# Patient Record
Sex: FEMALE | Race: BLACK OR AFRICAN AMERICAN | NOT HISPANIC OR LATINO | RURAL
[De-identification: names, ages, dates, MRNs, and addresses within clinical notes are randomized per-mention and may not be internally consistent; named-entity substitution may affect disease eponyms.]

---

## 2024-06-01 ENCOUNTER — HOSPITAL ENCOUNTER (INPATIENT)
Facility: HOSPITAL | Age: 89
LOS: 3 days | Discharge: NURSING, REHAB OR LONG-TERM CARE | DRG: 557 | End: 2024-06-04
Attending: EMERGENCY MEDICINE | Admitting: INTERNAL MEDICINE
Payer: MEDICARE

## 2024-06-01 DIAGNOSIS — N17.9 AKI (ACUTE KIDNEY INJURY): ICD-10-CM

## 2024-06-01 DIAGNOSIS — I35.1 AORTIC VALVE INSUFFICIENCY, ETIOLOGY OF CARDIAC VALVE DISEASE UNSPECIFIED: ICD-10-CM

## 2024-06-01 DIAGNOSIS — M62.82 NON-TRAUMATIC RHABDOMYOLYSIS: Primary | ICD-10-CM

## 2024-06-01 DIAGNOSIS — I21.A1 TYPE 2 MI (MYOCARDIAL INFARCTION): ICD-10-CM

## 2024-06-01 DIAGNOSIS — R53.81 DEBILITY: ICD-10-CM

## 2024-06-01 DIAGNOSIS — R79.89 ELEVATED TROPONIN LEVEL: ICD-10-CM
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
## 2024-06-01 DIAGNOSIS — E87.20 LACTIC ACIDOSIS: ICD-10-CM

## 2024-06-01 DIAGNOSIS — I50.21 ACUTE SYSTOLIC HEART FAILURE: ICD-10-CM

## 2024-06-01 DIAGNOSIS — R53.1 GENERAL WEAKNESS: ICD-10-CM

## 2024-06-01 DIAGNOSIS — R07.9 CHEST PAIN: ICD-10-CM

## 2024-06-01 PROBLEM — R65.20 SEVERE SEPSIS: Status: ACTIVE | Noted: 2024-06-01

## 2024-06-01 PROBLEM — A41.9 SEVERE SEPSIS: Status: ACTIVE | Noted: 2024-06-01

## 2024-06-01 LAB
ALBUMIN SERPL BCP-MCNC: 3.5 G/DL (ref 3.5–5)
ALBUMIN/GLOB SERPL: 0.9 {RATIO}
ALP SERPL-CCNC: 113 U/L (ref 55–142)
ALT SERPL W P-5'-P-CCNC: 81 U/L (ref 13–56)
ANION GAP SERPL CALCULATED.3IONS-SCNC: 10 MMOL/L (ref 7–16)
APTT PPP: 26.3 SECONDS (ref 25.2–37.3)
AST SERPL W P-5'-P-CCNC: 209 U/L (ref 15–37)
BACTERIA #/AREA URNS HPF: ABNORMAL /HPF
BASOPHILS # BLD AUTO: 0.01 K/UL (ref 0–0.2)
BASOPHILS NFR BLD AUTO: 0.1 % (ref 0–1)
BILIRUB SERPL-MCNC: 1.2 MG/DL (ref ?–1.2)
BILIRUB UR QL STRIP: NEGATIVE
BUN SERPL-MCNC: 28 MG/DL (ref 7–18)
BUN/CREAT SERPL: 19 (ref 6–20)
CALCIUM SERPL-MCNC: 9.9 MG/DL (ref 8.5–10.1)
CHLORIDE SERPL-SCNC: 113 MMOL/L (ref 98–107)
CK SERPL-CCNC: 5454 U/L (ref 26–192)
CLARITY UR: CLEAR
CO2 SERPL-SCNC: 25 MMOL/L (ref 21–32)
COLOR UR: YELLOW
CREAT SERPL-MCNC: 1.45 MG/DL (ref 0.55–1.02)
DIFFERENTIAL METHOD BLD: ABNORMAL
EGFR (NO RACE VARIABLE) (RUSH/TITUS): 34 ML/MIN/1.73M2
EOSINOPHIL # BLD AUTO: 0 K/UL (ref 0–0.5)
EOSINOPHIL NFR BLD AUTO: 0 % (ref 1–4)
ERYTHROCYTE [DISTWIDTH] IN BLOOD BY AUTOMATED COUNT: 14.2 % (ref 11.5–14.5)
GLOBULIN SER-MCNC: 3.7 G/DL (ref 2–4)
GLUCOSE SERPL-MCNC: 132 MG/DL (ref 74–106)
GLUCOSE UR STRIP-MCNC: NEGATIVE MG/DL
HCT VFR BLD AUTO: 37.4 % (ref 38–47)
HGB BLD-MCNC: 12.5 G/DL (ref 12–16)
HYALINE CASTS #/AREA URNS LPF: ABNORMAL /LPF
IMM GRANULOCYTES # BLD AUTO: 0.07 K/UL (ref 0–0.04)
IMM GRANULOCYTES NFR BLD: 0.5 % (ref 0–0.4)
INR BLD: 1.14
KETONES UR STRIP-SCNC: 15 MG/DL
LACTATE SERPL-SCNC: 2.7 MMOL/L (ref 0.4–2)
LACTATE SERPL-SCNC: 3.5 MMOL/L (ref 0.4–2)
LEUKOCYTE ESTERASE UR QL STRIP: NEGATIVE
LYMPHOCYTES # BLD AUTO: 1.66 K/UL (ref 1–4.8)
LYMPHOCYTES NFR BLD AUTO: 12.3 % (ref 27–41)
MAGNESIUM SERPL-MCNC: 2.4 MG/DL (ref 1.7–2.3)
MCH RBC QN AUTO: 27.5 PG (ref 27–31)
MCHC RBC AUTO-ENTMCNC: 33.4 G/DL (ref 32–36)
MCV RBC AUTO: 82.4 FL (ref 80–96)
MONOCYTES # BLD AUTO: 1.25 K/UL (ref 0–0.8)
MONOCYTES NFR BLD AUTO: 9.3 % (ref 2–6)
MPC BLD CALC-MCNC: 10.6 FL (ref 9.4–12.4)
MUCOUS, UA: ABNORMAL /LPF
NEUTROPHILS # BLD AUTO: 10.52 K/UL (ref 1.8–7.7)
NEUTROPHILS NFR BLD AUTO: 77.8 % (ref 53–65)
NITRITE UR QL STRIP: NEGATIVE
NRBC # BLD AUTO: 0 X10E3/UL
NRBC, AUTO (.00): 0 %
NT-PROBNP SERPL-MCNC: ABNORMAL PG/ML (ref 1–450)
PH UR STRIP: 5 PH UNITS
PHOSPHATE SERPL-MCNC: 3.1 MG/DL (ref 2.5–4.5)
PLATELET # BLD AUTO: 163 K/UL (ref 150–400)
POTASSIUM SERPL-SCNC: 4.4 MMOL/L (ref 3.5–5.1)
PROT SERPL-MCNC: 7.2 G/DL (ref 6.4–8.2)
PROT UR QL STRIP: 100
PROTHROMBIN TIME: 14.5 SECONDS (ref 11.7–14.7)
RBC # BLD AUTO: 4.54 M/UL (ref 4.2–5.4)
RBC # UR STRIP: ABNORMAL /UL
RBC #/AREA URNS HPF: 2 /HPF
SARS-COV-2 RDRP RESP QL NAA+PROBE: NEGATIVE
SODIUM SERPL-SCNC: 144 MMOL/L (ref 136–145)
SP GR UR STRIP: 1.02
SQUAMOUS #/AREA URNS LPF: ABNORMAL /HPF
TROPONIN I SERPL DL<=0.01 NG/ML-MCNC: 5452.7 PG/ML
TROPONIN I SERPL DL<=0.01 NG/ML-MCNC: 5582.6 PG/ML
TSH SERPL DL<=0.005 MIU/L-ACNC: 1.03 UIU/ML (ref 0.36–3.74)
UROBILINOGEN UR STRIP-ACNC: 0.2 MG/DL
WBC # BLD AUTO: 13.51 K/UL (ref 4.5–11)
WBC #/AREA URNS HPF: 1 /HPF

## 2024-06-01 PROCEDURE — 83605 ASSAY OF LACTIC ACID: CPT | Performed by: EMERGENCY MEDICINE

## 2024-06-01 PROCEDURE — 85025 COMPLETE CBC W/AUTO DIFF WBC: CPT | Performed by: EMERGENCY MEDICINE

## 2024-06-01 PROCEDURE — 83880 ASSAY OF NATRIURETIC PEPTIDE: CPT

## 2024-06-01 PROCEDURE — 11000001 HC ACUTE MED/SURG PRIVATE ROOM

## 2024-06-01 PROCEDURE — 84443 ASSAY THYROID STIM HORMONE: CPT

## 2024-06-01 PROCEDURE — 85730 THROMBOPLASTIN TIME PARTIAL: CPT | Performed by: EMERGENCY MEDICINE

## 2024-06-01 PROCEDURE — 81001 URINALYSIS AUTO W/SCOPE: CPT | Performed by: EMERGENCY MEDICINE

## 2024-06-01 PROCEDURE — 99223 1ST HOSP IP/OBS HIGH 75: CPT | Mod: AI,GC,, | Performed by: INTERNAL MEDICINE

## 2024-06-01 PROCEDURE — 87040 BLOOD CULTURE FOR BACTERIA: CPT | Performed by: EMERGENCY MEDICINE

## 2024-06-01 PROCEDURE — 36415 COLL VENOUS BLD VENIPUNCTURE: CPT | Performed by: EMERGENCY MEDICINE

## 2024-06-01 PROCEDURE — 84484 ASSAY OF TROPONIN QUANT: CPT | Performed by: EMERGENCY MEDICINE

## 2024-06-01 PROCEDURE — 25000003 PHARM REV CODE 250: Performed by: INTERNAL MEDICINE

## 2024-06-01 PROCEDURE — 83036 HEMOGLOBIN GLYCOSYLATED A1C: CPT

## 2024-06-01 PROCEDURE — 84100 ASSAY OF PHOSPHORUS: CPT

## 2024-06-01 PROCEDURE — 83735 ASSAY OF MAGNESIUM: CPT | Performed by: EMERGENCY MEDICINE

## 2024-06-01 PROCEDURE — 93010 ELECTROCARDIOGRAM REPORT: CPT | Mod: ,,, | Performed by: INTERNAL MEDICINE

## 2024-06-01 PROCEDURE — 63600175 PHARM REV CODE 636 W HCPCS

## 2024-06-01 PROCEDURE — 63600175 PHARM REV CODE 636 W HCPCS: Performed by: INTERNAL MEDICINE

## 2024-06-01 PROCEDURE — 93005 ELECTROCARDIOGRAM TRACING: CPT

## 2024-06-01 PROCEDURE — 82550 ASSAY OF CK (CPK): CPT

## 2024-06-01 PROCEDURE — 99285 EMERGENCY DEPT VISIT HI MDM: CPT | Mod: 25

## 2024-06-01 PROCEDURE — 85610 PROTHROMBIN TIME: CPT | Performed by: EMERGENCY MEDICINE

## 2024-06-01 PROCEDURE — 87635 SARS-COV-2 COVID-19 AMP PRB: CPT | Performed by: EMERGENCY MEDICINE

## 2024-06-01 PROCEDURE — 25000003 PHARM REV CODE 250: Performed by: EMERGENCY MEDICINE

## 2024-06-01 PROCEDURE — 96360 HYDRATION IV INFUSION INIT: CPT

## 2024-06-01 PROCEDURE — 80053 COMPREHEN METABOLIC PANEL: CPT | Performed by: EMERGENCY MEDICINE

## 2024-06-01 RX ORDER — SODIUM CHLORIDE 0.9 % (FLUSH) 0.9 %
10 SYRINGE (ML) INJECTION EVERY 12 HOURS PRN
Status: DISCONTINUED | OUTPATIENT
Start: 2024-06-01 | End: 2024-06-04 | Stop reason: HOSPADM

## 2024-06-01 RX ORDER — ALUMINUM HYDROXIDE, MAGNESIUM HYDROXIDE, AND SIMETHICONE 2400; 240; 2400 MG/30ML; MG/30ML; MG/30ML
15 SUSPENSION ORAL 4 TIMES DAILY PRN
Status: DISCONTINUED | OUTPATIENT
Start: 2024-06-01 | End: 2024-06-04 | Stop reason: HOSPADM

## 2024-06-01 RX ORDER — TALC
6 POWDER (GRAM) TOPICAL NIGHTLY PRN
Status: DISCONTINUED | OUTPATIENT
Start: 2024-06-01 | End: 2024-06-04 | Stop reason: HOSPADM

## 2024-06-01 RX ORDER — POLYETHYLENE GLYCOL 3350 17 G/17G
17 POWDER, FOR SOLUTION ORAL 2 TIMES DAILY PRN
Status: DISCONTINUED | OUTPATIENT
Start: 2024-06-01 | End: 2024-06-04 | Stop reason: HOSPADM

## 2024-06-01 RX ORDER — IBUPROFEN 200 MG
16 TABLET ORAL
Status: DISCONTINUED | OUTPATIENT
Start: 2024-06-01 | End: 2024-06-04 | Stop reason: HOSPADM

## 2024-06-01 RX ORDER — PROCHLORPERAZINE EDISYLATE 5 MG/ML
5 INJECTION INTRAMUSCULAR; INTRAVENOUS EVERY 6 HOURS PRN
Status: DISCONTINUED | OUTPATIENT
Start: 2024-06-01 | End: 2024-06-04 | Stop reason: HOSPADM

## 2024-06-01 RX ORDER — MULTIVITAMIN
1 TABLET ORAL DAILY
COMMUNITY

## 2024-06-01 RX ORDER — GLUCAGON 1 MG
1 KIT INJECTION
Status: DISCONTINUED | OUTPATIENT
Start: 2024-06-01 | End: 2024-06-04 | Stop reason: HOSPADM

## 2024-06-01 RX ORDER — ONDANSETRON 4 MG/1
8 TABLET, ORALLY DISINTEGRATING ORAL EVERY 8 HOURS PRN
Status: DISCONTINUED | OUTPATIENT
Start: 2024-06-01 | End: 2024-06-04 | Stop reason: HOSPADM

## 2024-06-01 RX ORDER — NALOXONE HCL 0.4 MG/ML
0.02 VIAL (ML) INJECTION
Status: DISCONTINUED | OUTPATIENT
Start: 2024-06-01 | End: 2024-06-04 | Stop reason: HOSPADM

## 2024-06-01 RX ORDER — IBUPROFEN 200 MG
24 TABLET ORAL
Status: DISCONTINUED | OUTPATIENT
Start: 2024-06-01 | End: 2024-06-04 | Stop reason: HOSPADM

## 2024-06-01 RX ORDER — ACETAMINOPHEN 500 MG
1000 TABLET ORAL EVERY 8 HOURS PRN
Status: DISCONTINUED | OUTPATIENT
Start: 2024-06-01 | End: 2024-06-04 | Stop reason: HOSPADM

## 2024-06-01 RX ORDER — ASPIRIN 325 MG
325 TABLET ORAL
Status: COMPLETED | OUTPATIENT
Start: 2024-06-01 | End: 2024-06-01

## 2024-06-01 RX ORDER — HEPARIN SODIUM,PORCINE/D5W 25000/250
0-40 INTRAVENOUS SOLUTION INTRAVENOUS CONTINUOUS
Status: DISCONTINUED | OUTPATIENT
Start: 2024-06-01 | End: 2024-06-02

## 2024-06-01 RX ORDER — AMOXICILLIN 250 MG
1 CAPSULE ORAL 2 TIMES DAILY PRN
Status: DISCONTINUED | OUTPATIENT
Start: 2024-06-01 | End: 2024-06-04 | Stop reason: HOSPADM

## 2024-06-01 RX ORDER — SODIUM CHLORIDE, SODIUM LACTATE, POTASSIUM CHLORIDE, CALCIUM CHLORIDE 600; 310; 30; 20 MG/100ML; MG/100ML; MG/100ML; MG/100ML
INJECTION, SOLUTION INTRAVENOUS CONTINUOUS
Status: DISCONTINUED | OUTPATIENT
Start: 2024-06-01 | End: 2024-06-02

## 2024-06-01 RX ORDER — ACETAMINOPHEN 325 MG/1
650 TABLET ORAL EVERY 4 HOURS PRN
Status: DISCONTINUED | OUTPATIENT
Start: 2024-06-01 | End: 2024-06-04 | Stop reason: HOSPADM

## 2024-06-01 RX ADMIN — SODIUM CHLORIDE 500 ML: 9 INJECTION, SOLUTION INTRAVENOUS at 11:06

## 2024-06-01 RX ADMIN — SODIUM CHLORIDE 1000 ML: 9 INJECTION, SOLUTION INTRAVENOUS at 08:06

## 2024-06-01 RX ADMIN — ASPIRIN 325 MG ORAL TABLET 325 MG: 325 PILL ORAL at 09:06

## 2024-06-01 RX ADMIN — PIPERACILLIN SODIUM,TAZOBACTAM SODIUM 4.5 G: 4; .5 INJECTION, POWDER, FOR SOLUTION INTRAVENOUS at 11:06

## 2024-06-01 RX ADMIN — HEPARIN SODIUM 18 UNITS/KG/HR: 10000 INJECTION, SOLUTION INTRAVENOUS at 11:06

## 2024-06-02 PROBLEM — T79.6XXA TRAUMATIC RHABDOMYOLYSIS: Status: ACTIVE | Noted: 2024-06-02

## 2024-06-02 PROBLEM — M62.82 NON-TRAUMATIC RHABDOMYOLYSIS: Status: ACTIVE | Noted: 2024-06-02

## 2024-06-02 PROBLEM — E87.20 LACTIC ACIDOSIS: Status: ACTIVE | Noted: 2024-06-01

## 2024-06-02 LAB
ALBUMIN SERPL BCP-MCNC: 3.8 G/DL (ref 3.5–5)
ALBUMIN/GLOB SERPL: 0.9 {RATIO}
ALP SERPL-CCNC: 121 U/L (ref 55–142)
ALT SERPL W P-5'-P-CCNC: 95 U/L (ref 13–56)
ANION GAP SERPL CALCULATED.3IONS-SCNC: 12 MMOL/L (ref 7–16)
AST SERPL W P-5'-P-CCNC: 253 U/L (ref 15–37)
BASOPHILS # BLD AUTO: 0.02 K/UL (ref 0–0.2)
BASOPHILS NFR BLD AUTO: 0.1 % (ref 0–1)
BILIRUB SERPL-MCNC: 1.5 MG/DL (ref ?–1.2)
BUN SERPL-MCNC: 27 MG/DL (ref 7–18)
BUN/CREAT SERPL: 20 (ref 6–20)
CALCIUM SERPL-MCNC: 9.9 MG/DL (ref 8.5–10.1)
CHLORIDE SERPL-SCNC: 113 MMOL/L (ref 98–107)
CHOLEST SERPL-MCNC: 158 MG/DL (ref 0–200)
CHOLEST/HDLC SERPL: 2.5 {RATIO}
CK SERPL-CCNC: 5301 U/L (ref 26–192)
CO2 SERPL-SCNC: 21 MMOL/L (ref 21–32)
CREAT SERPL-MCNC: 1.32 MG/DL (ref 0.55–1.02)
DIFFERENTIAL METHOD BLD: ABNORMAL
EGFR (NO RACE VARIABLE) (RUSH/TITUS): 38 ML/MIN/1.73M2
EOSINOPHIL # BLD AUTO: 0 K/UL (ref 0–0.5)
EOSINOPHIL NFR BLD AUTO: 0 % (ref 1–4)
ERYTHROCYTE [DISTWIDTH] IN BLOOD BY AUTOMATED COUNT: 14.7 % (ref 11.5–14.5)
EST. AVERAGE GLUCOSE BLD GHB EST-MCNC: 108 MG/DL
GLOBULIN SER-MCNC: 4.3 G/DL (ref 2–4)
GLUCOSE SERPL-MCNC: 123 MG/DL (ref 74–106)
HBA1C MFR BLD HPLC: 5.4 % (ref 4.5–6.6)
HCT VFR BLD AUTO: 43.9 % (ref 38–47)
HDLC SERPL-MCNC: 63 MG/DL (ref 40–60)
HGB BLD-MCNC: 14.6 G/DL (ref 12–16)
IMM GRANULOCYTES # BLD AUTO: 0.08 K/UL (ref 0–0.04)
IMM GRANULOCYTES NFR BLD: 0.5 % (ref 0–0.4)
LACTATE SERPL-SCNC: 2.6 MMOL/L (ref 0.4–2)
LACTATE SERPL-SCNC: 2.9 MMOL/L (ref 0.4–2)
LACTATE SERPL-SCNC: 4.9 MMOL/L (ref 0.4–2)
LDLC SERPL CALC-MCNC: 80 MG/DL
LDLC/HDLC SERPL: 1.3 {RATIO}
LYMPHOCYTES # BLD AUTO: 1.88 K/UL (ref 1–4.8)
LYMPHOCYTES NFR BLD AUTO: 12.5 % (ref 27–41)
MCH RBC QN AUTO: 27.8 PG (ref 27–31)
MCHC RBC AUTO-ENTMCNC: 33.3 G/DL (ref 32–36)
MCV RBC AUTO: 83.5 FL (ref 80–96)
MONOCYTES # BLD AUTO: 1.11 K/UL (ref 0–0.8)
MONOCYTES NFR BLD AUTO: 7.4 % (ref 2–6)
MPC BLD CALC-MCNC: 11.4 FL (ref 9.4–12.4)
NEUTROPHILS # BLD AUTO: 11.94 K/UL (ref 1.8–7.7)
NEUTROPHILS NFR BLD AUTO: 79.5 % (ref 53–65)
NONHDLC SERPL-MCNC: 95 MG/DL
NRBC # BLD AUTO: 0 X10E3/UL
NRBC, AUTO (.00): 0 %
PLATELET # BLD AUTO: 147 K/UL (ref 150–400)
POTASSIUM SERPL-SCNC: 4.2 MMOL/L (ref 3.5–5.1)
PROT SERPL-MCNC: 8.1 G/DL (ref 6.4–8.2)
RBC # BLD AUTO: 5.26 M/UL (ref 4.2–5.4)
SODIUM SERPL-SCNC: 142 MMOL/L (ref 136–145)
TRIGL SERPL-MCNC: 73 MG/DL (ref 35–150)
TROPONIN I SERPL DL<=0.01 NG/ML-MCNC: 3123.1 PG/ML
VLDLC SERPL-MCNC: 15 MG/DL
WBC # BLD AUTO: 15.03 K/UL (ref 4.5–11)

## 2024-06-02 PROCEDURE — 63600175 PHARM REV CODE 636 W HCPCS: Performed by: INTERNAL MEDICINE

## 2024-06-02 PROCEDURE — 94761 N-INVAS EAR/PLS OXIMETRY MLT: CPT

## 2024-06-02 PROCEDURE — 84484 ASSAY OF TROPONIN QUANT: CPT | Performed by: INTERNAL MEDICINE

## 2024-06-02 PROCEDURE — 97161 PT EVAL LOW COMPLEX 20 MIN: CPT

## 2024-06-02 PROCEDURE — 97165 OT EVAL LOW COMPLEX 30 MIN: CPT

## 2024-06-02 PROCEDURE — 83605 ASSAY OF LACTIC ACID: CPT | Performed by: EMERGENCY MEDICINE

## 2024-06-02 PROCEDURE — 36415 COLL VENOUS BLD VENIPUNCTURE: CPT | Performed by: EMERGENCY MEDICINE

## 2024-06-02 PROCEDURE — 85025 COMPLETE CBC W/AUTO DIFF WBC: CPT

## 2024-06-02 PROCEDURE — 25000003 PHARM REV CODE 250: Performed by: INTERNAL MEDICINE

## 2024-06-02 PROCEDURE — 25000003 PHARM REV CODE 250

## 2024-06-02 PROCEDURE — 36415 COLL VENOUS BLD VENIPUNCTURE: CPT | Performed by: INTERNAL MEDICINE

## 2024-06-02 PROCEDURE — 36415 COLL VENOUS BLD VENIPUNCTURE: CPT

## 2024-06-02 PROCEDURE — 80053 COMPREHEN METABOLIC PANEL: CPT

## 2024-06-02 PROCEDURE — 11000001 HC ACUTE MED/SURG PRIVATE ROOM

## 2024-06-02 PROCEDURE — 99233 SBSQ HOSP IP/OBS HIGH 50: CPT | Mod: ,,, | Performed by: INTERNAL MEDICINE

## 2024-06-02 PROCEDURE — 80061 LIPID PANEL: CPT

## 2024-06-02 PROCEDURE — A4217 STERILE WATER/SALINE, 500 ML: HCPCS | Performed by: INTERNAL MEDICINE

## 2024-06-02 PROCEDURE — 82550 ASSAY OF CK (CPK): CPT

## 2024-06-02 RX ORDER — METOPROLOL TARTRATE 25 MG/1
25 TABLET, FILM COATED ORAL 2 TIMES DAILY
Status: DISCONTINUED | OUTPATIENT
Start: 2024-06-02 | End: 2024-06-02

## 2024-06-02 RX ORDER — NAPROXEN SODIUM 220 MG/1
81 TABLET, FILM COATED ORAL DAILY
Status: DISCONTINUED | OUTPATIENT
Start: 2024-06-02 | End: 2024-06-04 | Stop reason: HOSPADM

## 2024-06-02 RX ORDER — ENOXAPARIN SODIUM 100 MG/ML
1 INJECTION SUBCUTANEOUS EVERY 24 HOURS
Status: DISCONTINUED | OUTPATIENT
Start: 2024-06-02 | End: 2024-06-04 | Stop reason: HOSPADM

## 2024-06-02 RX ORDER — SODIUM CHLORIDE, SODIUM LACTATE, POTASSIUM CHLORIDE, CALCIUM CHLORIDE 600; 310; 30; 20 MG/100ML; MG/100ML; MG/100ML; MG/100ML
INJECTION, SOLUTION INTRAVENOUS CONTINUOUS
Status: DISCONTINUED | OUTPATIENT
Start: 2024-06-02 | End: 2024-06-04

## 2024-06-02 RX ORDER — ENOXAPARIN SODIUM 100 MG/ML
1 INJECTION SUBCUTANEOUS EVERY 12 HOURS
Status: DISCONTINUED | OUTPATIENT
Start: 2024-06-02 | End: 2024-06-02

## 2024-06-02 RX ADMIN — SODIUM CHLORIDE: 4 INJECTION, SOLUTION, CONCENTRATE INTRAVENOUS at 01:06

## 2024-06-02 RX ADMIN — SODIUM CHLORIDE, POTASSIUM CHLORIDE, SODIUM LACTATE AND CALCIUM CHLORIDE: 600; 310; 30; 20 INJECTION, SOLUTION INTRAVENOUS at 11:06

## 2024-06-02 RX ADMIN — ENOXAPARIN SODIUM 60 MG: 60 INJECTION SUBCUTANEOUS at 04:06

## 2024-06-02 RX ADMIN — SODIUM CHLORIDE, POTASSIUM CHLORIDE, SODIUM LACTATE AND CALCIUM CHLORIDE: 600; 310; 30; 20 INJECTION, SOLUTION INTRAVENOUS at 10:06

## 2024-06-02 RX ADMIN — ASPIRIN 81 MG CHEWABLE TABLET 81 MG: 81 TABLET CHEWABLE at 11:06

## 2024-06-02 RX ADMIN — THERA TABS 1 TABLET: TAB at 11:06

## 2024-06-02 NOTE — SUBJECTIVE & OBJECTIVE
Interval History: Patient seen and examined at the bedside, denies chest pain or shortness of breath. Reports feeling better in terms of weakness.     Review of Systems   All other systems reviewed and are negative.    Objective:     Vital Signs (Most Recent):  Temp: 97.4 °F (36.3 °C) (06/02/24 1205)  Pulse: 72 (06/02/24 1205)  Resp: 18 (06/02/24 1205)  BP: 103/71 (06/02/24 1205)  SpO2: 96 % (06/02/24 1205) Vital Signs (24h Range):  Temp:  [97.4 °F (36.3 °C)-98.1 °F (36.7 °C)] 97.4 °F (36.3 °C)  Pulse:  [67-84] 72  Resp:  [12-27] 18  SpO2:  [93 %-100 %] 96 %  BP: ()/(57-71) 103/71     Weight: 62.1 kg (137 lb)  Body mass index is 22.8 kg/m².    Intake/Output Summary (Last 24 hours) at 6/2/2024 1233  Last data filed at 6/2/2024 0511  Gross per 24 hour   Intake 58.14 ml   Output --   Net 58.14 ml         Physical Exam  Vitals and nursing note reviewed.   Constitutional:       Comments: Frail appearing   HENT:      Head: Normocephalic and atraumatic.      Mouth/Throat:      Mouth: Mucous membranes are moist.   Eyes:      Extraocular Movements: Extraocular movements intact.      Pupils: Pupils are equal, round, and reactive to light.   Cardiovascular:      Rate and Rhythm: Normal rate and regular rhythm.   Pulmonary:      Effort: Pulmonary effort is normal.      Breath sounds: Normal breath sounds.   Abdominal:      General: Bowel sounds are normal.      Palpations: Abdomen is soft.   Musculoskeletal:         General: Normal range of motion.      Cervical back: Normal range of motion and neck supple.   Neurological:      General: No focal deficit present.      Mental Status: She is alert and oriented to person, place, and time. Mental status is at baseline.   Psychiatric:         Mood and Affect: Mood normal.         Behavior: Behavior normal.             Significant Labs: All pertinent labs within the past 24 hours have been reviewed.    Significant Imaging: I have reviewed all pertinent imaging results/findings  within the past 24 hours.

## 2024-06-02 NOTE — ED PROVIDER NOTES
Encounter Date: 2024       History     Chief Complaint   Patient presents with    Fatigue     Patient arrives by ambulance from her home where she has been generally weak and falling a lot today.  Patient despite her age of 89 has no known medical history per the neighbor who called the ambulance.  Patient it was .  She had 1 child a daughter who has .  Patient does have grandchildren in [place].  She lives alone.  A neighbor checks on her says that she was in the floor multiple times today.      Patient denies any right pain but does have some mild tenderness of the right hip.  No focal weakness or numbness.      Review of patient's allergies indicates:  No Known Allergies  Past Medical History:   Diagnosis Date    Chronic low back pain     Chronic pain of right knee     Mixed hyperlipidemia     Syncope     Vitamin D deficiency      History reviewed. No pertinent surgical history.  No family history on file.  Social History     Tobacco Use    Smoking status: Never    Smokeless tobacco: Never   Substance Use Topics    Drug use: Never     Review of Systems   Constitutional:  Negative for fever.   HENT:  Negative for sore throat.    Respiratory:  Negative for shortness of breath.    Cardiovascular:  Negative for chest pain.   Gastrointestinal:  Negative for nausea.   Genitourinary:  Negative for dysuria.   Musculoskeletal:  Negative for back pain.   Skin:  Negative for rash.   Neurological:  Positive for light-headedness. Negative for speech difficulty and weakness (No focal weakness.).   Hematological:  Does not bruise/bleed easily.       Physical Exam     Initial Vitals   BP Pulse Resp Temp SpO2   24   91/61 83 19 98.1 °F (36.7 °C) 96 %      MAP       --                Physical Exam    Nursing note and vitals reviewed.  Constitutional: She appears well-developed and well-nourished.   HENT:   Head: Normocephalic and atraumatic.   Eyes:  EOM are normal. Pupils are equal, round, and reactive to light.   Neck: Neck supple. No thyromegaly present.   Normal range of motion.  Cardiovascular:  Normal rate, regular rhythm, normal heart sounds and intact distal pulses.           No murmur heard.  Pulmonary/Chest: Breath sounds normal. No respiratory distress. She has no wheezes.   Abdominal: Abdomen is soft. Bowel sounds are normal. She exhibits no distension. There is no abdominal tenderness.   Musculoskeletal:         General: No tenderness or edema. Normal range of motion.      Cervical back: Normal range of motion and neck supple.     Lymphadenopathy:     She has no cervical adenopathy.   Neurological: She is alert. She has normal strength. No cranial nerve deficit or sensory deficit.   Oriented to person and place   Skin: Skin is warm and dry. No rash noted.   Psychiatric: She has a normal mood and affect.         Medical Screening Exam   See Full Note    ED Course   Procedures  Labs Reviewed   COMPREHENSIVE METABOLIC PANEL - Abnormal; Notable for the following components:       Result Value    Chloride 113 (*)     Glucose 132 (*)     BUN 28 (*)     Creatinine 1.45 (*)     ALT 81 (*)      (*)     eGFR 34 (*)     All other components within normal limits   TROPONIN I - Abnormal; Notable for the following components:    Troponin I High Sensitivity 5,582.6 (*)     All other components within normal limits   MAGNESIUM - Abnormal; Notable for the following components:    Magnesium 2.4 (*)     All other components within normal limits   LACTIC ACID, PLASMA - Abnormal; Notable for the following components:    Lactic Acid 3.5 (*)     All other components within normal limits   CBC WITH DIFFERENTIAL - Abnormal; Notable for the following components:    WBC 13.51 (*)     Hematocrit 37.4 (*)     Neutrophils % 77.8 (*)     Lymphocytes % 12.3 (*)     Monocytes % 9.3 (*)     Eosinophils % 0.0 (*)     Immature Granulocytes % 0.5 (*)     Neutrophils, Abs 10.52  (*)     Monocytes, Absolute 1.25 (*)     Immature Granulocytes, Absolute 0.07 (*)     All other components within normal limits   PROTIME-INR - Normal   APTT - Normal   CULTURE, BLOOD   CULTURE, BLOOD   CBC W/ AUTO DIFFERENTIAL    Narrative:     The following orders were created for panel order CBC auto differential.  Procedure                               Abnormality         Status                     ---------                               -----------         ------                     CBC with Differential[[id number]]       Abnormal            Final result                 Please view results for these tests on the individual orders.   URINALYSIS, REFLEX TO URINE CULTURE   SARS-COV-2 RNA AMPLIFICATION, QUAL          Imaging Results              X-Ray Hip 2 or 3 views Right with Pelvis when performed (Final result)  Result time 06/01/24 21:20:58      Final result by Khoi Haynes MD (06/01/24 21:20:58)                   Impression:      No acute radiographic abnormality.  Moderate degenerative changes.    Place of service: [facility]      Electronically signed by: Khoi Haynes  Date:    06/01/2024  Time:    21:20               Narrative:    EXAMINATION:  XR hip with pelvis, three views right    CLINICAL HISTORY:  Hip pain fall    TECHNIQUE:  AP frogleg    COMPARISON:  None    FINDINGS:  Osseous pelvis is intact.  Osteopenia is noted.  The right hip joint appears intact with no evidence of acute fracture or dislocation.  There is moderate to severe joint space loss and osteophytic spurring suggested at both hip joints.  No suspicious osseous or soft tissue lesions are identified.                                       X-Ray Chest 1 View (Final result)  Result time 06/01/24 21:19:25      Final result by Khoi Haynes MD (06/01/24 21:19:25)                   Impression:      No acute cardiopulmonary process.    Place of service: [facility]      Electronically signed  by: Khoi Haynes  Date:    06/01/2024  Time:    21:19               Narrative:    EXAMINATION:  XR CHEST 1 VIEW    CLINICAL HISTORY:  Weakness    TECHNIQUE:  Portable one view    COMPARISON:  None    FINDINGS:  The cardiomediastinal silhouette is within normal limits. The lungs are clear.  Punctate probable granulomatous calcifications are noted throughout the lungs bilaterally.  There is no pneumothorax or pleural effusion.    There is no acute osseous or soft tissue abnormality.  Somewhat prominent degenerative changes are noted within both shoulders.                                       CT Head Without Contrast (Final result)  Result time 06/01/24 21:11:29      Final result by Khoi Haynes MD (06/01/24 21:11:29)                   Impression:      1. No acute intracranial abnormality.    2. Findings suggestive of sequela of chronic microvascular ischemia.  If clinical symptoms persist or worsen, consider further evaluation with MRI imaging of the brain.    Place of service: [facility]      Electronically signed by: Khoi Haynes  Date:    06/01/2024  Time:    21:11               Narrative:    EXAMINATION:  CT HEAD WITHOUT CONTRAST    CLINICAL HISTORY:  Mental status change, unknown cause;    TECHNIQUE:  Axial CT imaging from the vertex to skull the skull base was performed without contrast. Total DLP: 973 mGy*cm    Dose reduction:    The CT exam was performed using one or more dose reduction techniques: Automatic exposure control, automated adjustment of the MA and/or kVP according to patient size, or use of iterative reconstruction technique.    COMPARISON:  None.    FINDINGS:  Mild generalized atrophy is noted with prominence of sulci and ventricles.  The basilar cisterns are within normal limits in appearance. There is no evidence of hydrocephalus, midline shift or mass effect.  Periventricular hypodensity changes are noted which do not demonstrate mass effect on are favored to represent sequela  of chronic microvascular ischemia.  Otherwise, gray and white matter differentiation is preserved. There is no CT evidence of acute intracranial hemorrhage or infarction.    The calvarium is intact. The visualized orbits and globes appear within normal limits. The paranasal sinuses and mastoid air cells are predominantly clear. Scalp soft tissues appear unremarkable.                                       Medications   sodium chloride 0.9% bolus 1,000 mL 1,000 mL (1,000 mLs Intravenous New Bag 6/1/24 2059)   sodium chloride 0.9% bolus 500 mL 500 mL (has no administration in time range)   aspirin tablet 325 mg (325 mg Oral Given 6/1/24 2140)     Medical Decision Making  MDM    Patient presents for emergent evaluation of acute generalized weakness frequent falls that poses a threat to life and/or bodily function.    In the ED patient found to have acute NSTEMI.  Lactic acidosis.  Urinalysis pending at the time of disposition  I ordered labs and personally reviewed them.  Labs significant for elevated troponin.  Creatinine 1.5 no baseline comparison.    I ordered X-rays and personally reviewed them and reviewed the radiologist interpretation.  Xray significant for chest x-ray no acute abnormality.  X-ray hip no fracture.  I ordered EKG and personally reviewed it.  EKG significant for T-wave inversions.  No ST elevation.    I ordered CT scan and personally reviewed it and reviewed the radiologist interpretation.  CT significant for CT head no acute abnormality.  No intracranial hemorrhage..      Admission MDM  I discussed the patient presentation and findings with the consultant for Hospital Medicine (speciality).    Patient was managed in the ED with IV normal saline Zosyn aspirin.    The response to treatment was stable.    Patient required emergent consultation to Hospital Medicine (admitting physician) for admission.     Amount and/or Complexity of Data Reviewed  Labs: ordered.  Radiology: ordered.    Risk  OTC  drugs.               ED Course as of 06/01/24 2147   Sat Jun 01, 2024 2121 Patient denies chest pain [PK]   2128 No source of infection identified.  Patient does smell of urine had some urinary incontinence.  White counts elevated and also lactic acid is elevated.  Patient does have significantly elevated troponin with some T-wave inversions in the anterior septal leads.  No ST elevation do not think this is consistent with a STEMI.  Will treat with aspirin for now and will also have her hospitalized.  Will defer to Hospital Medicine in regard of heparinization.  Will treat with empiric early goal-directed fluid therapy and also  antibiotic [PK]   2129 Patient had 500 mL of normal saline that was started by EMS and discontinued here at 9:00 p.m. [PK]   2133 hospitalize patient for failure to thrive and elevated troponin.  Urinalysis is not back yet.  Patient comes in by EMS from her home where she lives alone.  She does have a friend that checks on her regularly says that she would fallen multiple times today.  Do not suspect that she was injured due to the falls.  She denies chest pain.  Troponin is significantly elevated but denies chest pain and also the EKG does not show ST elevation.  She does have some T-wave inversions diffusely but no priors for comparison.  Patient has no known medical history despite her age.  She has a great great great granddaughter who is next of kin who we called and will be driving from [facility] ultimately to come get her.  Treating with aspirin.  Will defer to you home rather to heparinized.  Also treating with Zosyn due to leukocytosis and elevated lactic acid.  Blood pressure is 96/63 heart rate is 78.  She is awake alert and oriented looks okay for the floor in my opinion.  Will be giving her early goal-directed fluid therapy also and getting a 2nd troponin and a 2nd lactic acid   [PK]      ED Course User Index  [PK] Khoi Salinas MD                            Clinical Impression:   Final diagnoses:  [R53.1] General weakness  [W19.XXXA] Khoi Spear MD  06/01/24 6359

## 2024-06-02 NOTE — PT/OT/SLP EVAL
Occupational Therapy   Evaluation    Name: Steve Castro  MRN: [id number]  Admitting Diagnosis: Non-traumatic rhabdomyolysis  Recent Surgery: * No surgery found *      Recommendations:     Discharge Recommendations: Moderate Intensity Therapy (to low intensity, bepending on rate of recovery)  Discharge Equipment Recommendations:   (to be determined)  Barriers to discharge:  Decreased caregiver support (on going treatment)    Assessment:     Steve Castro is a 89 y.o. female with a medical diagnosis of Non-traumatic rhabdomyolysis.  She presents with alert and agreeable to treatment. Performance deficits affecting function: weakness, impaired endurance, impaired self care skills, impaired functional mobility, gait instability, impaired balance, decreased upper extremity function, decreased ROM, impaired cardiopulmonary response to activity.      Rehab Prognosis: Good; patient would benefit from acute skilled OT services to address these deficits and reach maximum level of function.       Plan:     Patient to be seen 5 x/week to address the above listed problems via self-care/home management, therapeutic activities, therapeutic exercises  Plan of Care Expires: 06/23/24  Plan of Care Reviewed with: patient    Subjective     Chief Complaint: Non-traumatic rhabdomyolysis    Patient/Family Comments/goals: Pt wants to return home at D/C. Pt is considering swingbed as a possible option if she requires additional help at D/C.    Occupational Profile:  Living Environment: Pt lives alone in a single level home  Previous level of function: Pt was independent with her self-care and homemaking and meal prep. Pt used a rollator for mobility.  Roles and Routines: Pt took care of herself and her home. Pt's friends and neighbors checked in on her regularly.  Equipment Used at Home: rollator  Assistance upon Discharge: Pt will have assistance from facility staff of family and friends    Pain/Comfort:  Pain Rating 1: 0/10  Pain Rating  Post-Intervention 1: 0/10    Patients cultural, spiritual, [employer] conflicts given the current situation: no    Objective:     Communicated with: EYRN Patricia prior to session.  Patient found supine with telemetry, peripheral IV upon OT entry to room.    General Precautions: Standard, fall  Orthopedic Precautions: N/A  Braces: N/A  Respiratory Status: Room air    Occupational Performance:    Bed Mobility:    Patient completed Supine to Sit with minimum assistance    Functional Mobility/Transfers:  Patient completed Sit <> Stand Transfer with minimum assistance  with  rolling walker   Patient completed Bed <> Chair Transfer using Step Transfer technique with minimum assistance with rolling walker  Patient completed Toilet Transfer Step Transfer technique with minimum assistance with  rolling walker  Functional Mobility: Pt ambulated short distances in room with RW and min(A) with a very flexed posture, short, narrow steps, and increased effort.    Activities of Daily Living:  Upper Body Dressing: maximal assistance due to (B) IV  Toileting: total assistance for hygiene and clothes management    Cognitive/Visual Perceptual:  Cognitive/Psychosocial Skills:     -       Oriented to: Person, Place, and Situation   -       Follows Commands/attention:Follows one-step commands  -       Communication: clear/fluent  -       Safety awareness/insight to disability: intact   -       Mood/Affect/Coping skills/emotional control: Cooperative    Physical Exam:  Balance:    -       sitting with supervision, standing with min(A) with RW  Dominant hand:    -       right  Upper Extremity Range of Motion:     -       Right Upper Extremity: WFL except shoulder is severely impaired  -       Left Upper Extremity: WFL except shoulder is mildly impaired  Upper Extremity Strength:    -       Right Upper Extremity: WFL except shoulder is severely impaired  -       Left Upper Extremity: WFL except shoulder is mildly impaired    Strength:    -       Right Upper Extremity: WFL  -       Left Upper Extremity: WFL  Fine Motor Coordination:    -       Intact  Gross motor coordination:   WFL    AMPAC 6 Click ADL:  AMPAC Total Score: 16    Treatment & Education:  Pt educated on OT role/POC.   Importance of OOB activity with staff assistance.  Importance of sitting up in the chair throughout the day as tolerated, especially for meals   Safety during functional t/f and mobility with use of RW  Importance of assisting with self-care activities   All questions/concerns answered within OT scope of practice      Patient left up in chair with all lines intact, call button in reach, and RN notified    GOALS:   Multidisciplinary Problems       Occupational Therapy Goals          Problem: Occupational Therapy    Goal Priority Disciplines Outcome Interventions   Occupational Therapy Goal     OT, PT/OT Progressing    Description: STG: (in 1 week)  Pt will perform grooming with setup  Pt will bathe with setup and CGA  Pt will perform UE dressing with setup  Pt will perform LE dressing with CGA  Pt will sit EOB x 10 min with independence during a dynamic activity   Pt will transfer bed/chair/bsc with SBA with RW  Pt will perform standing task x 3 min with CGA with RW   Pt will tolerate 15 minutes of tx without fatigue      LTG: (in 5 weeks)  1.Restore to max I with self care and mobility.                        History:     Past Medical History:   Diagnosis Date    Chronic low back pain     Chronic pain of right knee     Mixed hyperlipidemia     Syncope     Vitamin D deficiency        History reviewed. No pertinent surgical history.    Time Tracking:     OT Date of Treatment: 06/02/24  OT Start Time: 0905  OT Stop Time: 0940  OT Total Time (min): 35 min    Billable Minutes:Evaluation low complexity    6/2/2024

## 2024-06-02 NOTE — PLAN OF CARE
Problem: Occupational Therapy  Goal: Occupational Therapy Goal  Description: STG: (in 1 week)  Pt will perform grooming with setup  Pt will bathe with setup and CGA  Pt will perform UE dressing with setup  Pt will perform LE dressing with CGA  Pt will sit EOB x 10 min with independence during a dynamic activity   Pt will transfer bed/chair/bsc with SBA with RW  Pt will perform standing task x 3 min with CGA with RW   Pt will tolerate 15 minutes of tx without fatigue      LTG: (in 5 weeks)  1.Restore to max I with self care and mobility.   Outcome: Progressing     Pt demonstrates quick fatigue and increased need for assistance with mobility and self-care than her stated baseline. Depending on pt's rate of recovery, pt may benefit from swingbed to increase strength and mobility and self care skills prior to returning home.

## 2024-06-02 NOTE — HOSPITAL COURSE
6/2- Echocardiogram pending. Cardiology consulted, do not recommend any intervention at this point, changed heparin to weight based Lovenox, continue aspirin.   6/3- Difficult stick so labs drawn late, echo still pending. PT/OT follow, may require swing bed placement.     6/4 patient doing well today.  She declines left heart catheterization.  Her blood pressure will not tolerate guideline directed medical therapy for her heart failure.  We will have her follow up with Cardiology in 1-2 weeks.  She is agreeable to swing bed.  Plan for discharge to swing bed.  Follow up with PCP in 1 week.

## 2024-06-02 NOTE — ED TRIAGE NOTES
Pt brought in by EMS. Pt lives at home alone and a neighbor checks on her periodically throughout the day, neighbor called ems for frequent falls.

## 2024-06-02 NOTE — PLAN OF CARE
Problem: Adult Inpatient Plan of Care  Goal: Plan of Care Review  Outcome: Progressing  Goal: Patient-Specific Goal (Individualized)  Outcome: Progressing  Goal: Absence of Hospital-Acquired Illness or Injury  Outcome: Progressing  Goal: Optimal Comfort and Wellbeing  Outcome: Progressing  Goal: Readiness for Transition of Care  Outcome: Progressing     Problem: Sepsis/Septic Shock  Goal: Optimal Coping  Outcome: Progressing  Goal: Absence of Bleeding  Outcome: Progressing  Goal: Blood Glucose Level Within Targeted Range  Outcome: Progressing  Goal: Absence of Infection Signs and Symptoms  Outcome: Progressing  Goal: Optimal Nutrition Intake  Outcome: Progressing     Problem: Acute Kidney Injury/Impairment  Goal: Fluid and Electrolyte Balance  Outcome: Progressing  Goal: Improved Oral Intake  Outcome: Progressing  Goal: Effective Renal Function  Outcome: Progressing

## 2024-06-02 NOTE — PHARMACY MED REC
"Admission Medication History     The home medication history was taken by Amanda Walls.    You may go to "Admission" then "Reconcile Home Medications" tabs to review and/or act upon these items.     The home medication list has been updated by the Pharmacy department.   Please read ALL comments highlighted in yellow.   Please address this information as you see fit.    Feel free to contact us if you have any questions or require assistance.  According to patient, she doesn't take any prescription medications. She does occasionally take Tylenol as needed, and she takes a multivitamin, as well as Calcium, Vitamin D, and Ocuvite vitamins.      Medications listed below were obtained from: Patient/family and Analytic software- [employer]  (Not in a hospital admission)        Current Outpatient Medications on File Prior to Encounter   Medication Sig Dispense Refill Last Dose    multivitamin (THERAGRAN) per tablet Take 1 tablet by mouth once daily.   6/1/2024         Potential issues to be addressed PRIOR TO DISCHARGE  No issues identified.    Amanda Walls  Pharmacy Tech Specialist - Medication History  EXT. [id number]      .          "

## 2024-06-02 NOTE — ASSESSMENT & PLAN NOTE
Patient presented with elevated serum lactic acid. It was 3.5 at presentation and reduced to 2.7 after IV hydration.    Latest lactate reviewed-  Recent Labs   Lab 06/01/24  2238   LACTATE 2.7*       - blood cultures from two sites pending  - hydration via IV LR  - no source of infection was detected

## 2024-06-02 NOTE — ASSESSMENT & PLAN NOTE
This patient  indeterminate  have evidence of infective focus  My overall impression is sepsis.  Source: Unknown  Antibiotics given-   Antibiotics (72h ago, onward)      Start     Stop Route Frequency Ordered    06/02/24 0200  piperacillin-tazobactam (ZOSYN) 4.5 g in dextrose 5 % in water (D5W) 100 mL IVPB (MB+)         -- IV Every 8 hours (non-standard times) 06/01/24 2224    06/01/24 2230  piperacillin-tazobactam (ZOSYN) 4.5 g in dextrose 5 % in water (D5W) 100 mL IVPB (MB+)         06/02/24 1029 IV ED 1 Time 06/01/24 2228          Latest lactate reviewed-  Recent Labs   Lab 06/01/24 2034   LACTATE 3.5*     Organ dysfunction indicated by Acute kidney injury and elevated troponin and elevated lactate        Post- resuscitation assessment No - Post resuscitation assessment not needed       Will Not start Pressors- Levophed for MAP of 65  Source control achieved by: IV antibiotics  - blood cultures from two sites pending  - lactate was 3.5  - hydration via IV LR  - IV zosyn

## 2024-06-02 NOTE — ASSESSMENT & PLAN NOTE
Patient found to have Type 2 MI after presenting with:     KACY Risk Score: 75 (intermediate risk)  Wells score: 0  ECG:   NSR with no acute ST segment changes  Trending Troponin  ECHO pending  Patient on telemetry  Heparin infusion with NORMOGRAM for ACS protocol  Consulted cardiology and appreciate recommendations.     Troponin:   Recent Labs   Lab 06/01/24 2033   TROPONINIHS 5,582.6*

## 2024-06-02 NOTE — ASSESSMENT & PLAN NOTE
Patient found to have Type 2 MI after presenting with:     KACY Risk Score: 75 (intermediate risk)  Wells score: 0  ECG: NSR with no acute ST segment changes  Troponin significantly elevated however trend is flat.    Troponin:   Recent Labs   Lab 06/01/24  2238   TROPONINIHS 5,452.7*   ECHO pending  Weight based with Lovenox for ACS protocol.  Consulted cardiology and appreciate recommendations.    Holding off statin due to rabdomyolysis  Monitor on tele.

## 2024-06-02 NOTE — PLAN OF CARE
Problem: Physical Therapy  Goal: Physical Therapy Goal  Description: Short Term Goals to be met by: 2024    Patient will increase functional independence with mobility by performin. Supine to sit with independently  2. Sit to stand transfer with independently using Rolling walker  3. Bed to chair transfer with independently using Rolling walker  4. Gait  x 100 feet with independently using Rolling walker  5. Lower extremity exercise program x30 reps per handout, with assistance as needed    Long Term Goals to be met by: 2024    Pt will regain full independent functional mobility with rollator walker to return to home situation and prior activities of daily living.   Outcome: Progressing     Patient demonstrates a significant decline in functional mobility from reported baseline and is not safe to return to independent living situation at this time. Unless patient improves drastically over the next few days, swing bed is recommended.

## 2024-06-02 NOTE — ASSESSMENT & PLAN NOTE
Patient with acute kidney injury likely due to pre-renal azotemia due to dehydration and rhabdomyolysis. PARK is currently improving with IV hydration which will be continued.  Baseline creatinine unknown - Labs reviewed- Renal function/electrolytes with Estimated Creatinine Clearance: 22.3 mL/min (A) (based on SCr of 1.45 mg/dL (H)). according to latest data.   Monitor urine output and serial BMP and adjust therapy as needed.   Avoid nephrotoxins and renally dose meds for GFR listed above.

## 2024-06-02 NOTE — ASSESSMENT & PLAN NOTE
Patient with Acute on chronic debility due to age-related physical debility. Latest AMPAC and GEMS scores have not been reviewed. Evaluation for etiology is complete. Plan includes progressive mobility protocol initated, PT/OT consulted, and fall precautions in place.

## 2024-06-02 NOTE — CONSULTS
[facility]  Cardiology  Consult Note    Patient Name: Steve Castro  MRN: [id number]  Admission Date: 6/1/2024  Hospital Length of Stay: 1 days  Code Status: Full Code   Attending Provider: Chris Sheridan MD   Consulting Provider: Royer Yu MD  Primary Care Physician: Yudi, Primary Doctor  Principal Problem:Type 2 MI (myocardial infarction)    Patient information was obtained from patient, ER records, and primary team.     Consults  Subjective:     Chief Complaint: Fall    HPI:   Patient is a 89 yo female with no significant PMH. She presented to the [facility] ED with complaints of frequent falls. She reported that she has fallen at least twice since yesterday. She said that the first time she fell yesterday was when she was making breakfast and fell down. She said that she felt like she lost her balance and fell. She denied any dizziness, black out, diaphoresis, chest pain, SOB, palpitations, or lightheadedness before she fell. She denied hitting her head during the fall or loss of consciousness.    She reported that she has  not been eating or drinking well for last few days/weeks. Her urine output has been decreased that she contributed to decreased food intake.She has decreased appetite. She denied any chills, fever, myalgias, gastrointestinal or genitourinary symptoms.     She lives alone at her home and uses walker to move around. She is independent in ADLs. Her neighbors and [employer] members keep check on her.    Her neighbor was at the bedside and told that she has fallen at least 6-7 times since 2024. They found her lying down on floor yesterday after fall and had to help get up from the floor.    ED course:  Vitals:  P        BP         Temp      pO2  81        91/61     98.1°F      96  %  Labs:  CBC  13.51 12.5 163    37.4          BMP  144 113 28 132   4.4 25 1.45       Troponin was 5582.6  Lactate : 3.5  EKG: NSR no ST segment changes    Past Medical History:   Diagnosis Date     Chronic low back pain     Chronic pain of right knee     Mixed hyperlipidemia     Syncope     Vitamin D deficiency        History reviewed. No pertinent surgical history.    Review of patient's allergies indicates:  No Known Allergies    No current facility-administered medications on file prior to encounter.     Current Outpatient Medications on File Prior to Encounter   Medication Sig    multivitamin (THERAGRAN) per tablet Take 1 tablet by mouth once daily.     Family History    None       Tobacco Use    Smoking status: Never    Smokeless tobacco: Never   Substance and Sexual Activity    Alcohol use: Not on file    Drug use: Never    Sexual activity: Not on file     Review of Systems   Constitutional: Negative for diaphoresis and fever.   HENT:  Negative for hoarse voice.    Eyes:  Negative for visual disturbance and visual halos.   Cardiovascular:  Negative for chest pain and leg swelling.   Respiratory:  Negative for shortness of breath.    Gastrointestinal:  Negative for abdominal pain.   Psychiatric/Behavioral:  Negative for altered mental status, depression and hallucinations.      Objective:     Vital Signs (Most Recent):  Temp: 97.4 °F (36.3 °C) (06/02/24 1205)  Pulse: 72 (06/02/24 1205)  Resp: 18 (06/02/24 1205)  BP: 103/71 (06/02/24 1205)  SpO2: 96 % (06/02/24 1205) Vital Signs (24h Range):  Temp:  [97.4 °F (36.3 °C)-98.1 °F (36.7 °C)] 97.4 °F (36.3 °C)  Pulse:  [67-84] 72  Resp:  [12-27] 18  SpO2:  [93 %-100 %] 96 %  BP: ()/(57-71) 103/71     Weight: 62.1 kg (137 lb)  Body mass index is 22.8 kg/m².    SpO2: 96 %         Intake/Output Summary (Last 24 hours) at 6/2/2024 1543  Last data filed at 6/2/2024 0511  Gross per 24 hour   Intake 58.14 ml   Output --   Net 58.14 ml       Lines/Drains/Airways       Peripheral Intravenous Line  Duration                  Peripheral IV - Single Lumen 06/01/24 20 G Anterior;Right Forearm 1 day         Peripheral IV - Single Lumen 06/01/24 2300 22 G  Anterior;Left;Proximal Forearm <1 day                     Physical Exam  Vitals and nursing note reviewed.   Constitutional:       General: She is not in acute distress.     Appearance: Normal appearance. She is not ill-appearing.   HENT:      Head: Normocephalic and atraumatic.      Right Ear: External ear normal.      Left Ear: External ear normal.   Eyes:      General:         Right eye: No discharge.         Left eye: No discharge.      Conjunctiva/sclera: Conjunctivae normal.   Cardiovascular:      Rate and Rhythm: Normal rate and regular rhythm.      Pulses: Normal pulses.      Heart sounds: Murmur heard.   Pulmonary:      Effort: Pulmonary effort is normal.      Breath sounds: Normal breath sounds. No wheezing, rhonchi or rales.   Abdominal:      Palpations: Abdomen is soft.   Musculoskeletal:      Cervical back: Neck supple.      Right lower leg: No edema.      Left lower leg: No edema.   Skin:     General: Skin is warm.   Neurological:      Mental Status: She is alert and oriented to person, place, and time.   Psychiatric:         Mood and Affect: Mood normal.         Behavior: Behavior normal.          Significant Labs:   Recent Lab Results  (Last 5 results in the past 24 hours)        06/02/24  0556   06/02/24  0355   06/02/24  0051   06/01/24  2238   06/01/24  2233        Albumin/Globulin Ratio   0.9             Albumin   3.8             ALP   121             ALT   95             Anion Gap   12             Appearance, UA         Clear       PTT               AST   253             Bacteria, UA         Occasional       Baso #   0.02             Basophil %   0.1             Bilirubin (UA)         Negative       BILIRUBIN TOTAL   1.5             BUN   27             BUN/CREAT RATIO   20             Calcium   9.9             Chloride   113             CHOL/HDLC Ratio   2.5             Cholesterol Total   158  Comment:   <200 mg/dL:  Desirable  200-240 mg/dL: Borderline High  >240 mg/dL:  High             CO2    21             Color, UA         Yellow       SARS COV-2 MOLECULAR         Negative       CPK   5,301             Creatinine   1.32             Differential Method   Auto             eGFR   38             Eos #   0.00             Eos %   0.0             Estimated Avg Glucose               Globulin, Total   4.3             Glucose   123             Glucose, UA         Negative       HDL   63  Comment:   <40 mg/dL: Low HDL  40-60 mg/dL: Normal  >60 mg/dL: Desirable             Hematocrit   43.9             Hemoglobin   14.6             Hemoglobin A1C External               Hyaline Casts, UA         0-2       Immature Grans (Abs)   0.08             Immature Granulocytes   0.5             INR               Ketones, UA         15       Lactic Acid Level 2.9  Comment: A repeat order for Lactic Acid has been placed for collection in 2 hours.   4.9  Comment: A repeat order for Lactic Acid has been placed for collection in 2 hours.   2.6  Comment: A repeat order for Lactic Acid has been placed for collection in 2 hours.   2.7  Comment: A repeat order for Lactic Acid has been placed for collection in 2 hours.         LDL Calculated   80  Comment: Unable to calculate due to one of the following values:  Cholesterol <5  HDL Cholesterol <5  Triglycerides <10 or >400             LDL/HDL Ratio   1.3  Comment: Unable to calculate due to one of the following values:  Cholesterol <5  HDL Cholesterol <5  Triglycerides <10 or >400             Leukocyte Esterase, UA         Negative       Lymph #   1.88             Lymph %   12.5             Magnesium                MCH   27.8             MCHC   33.3             MCV   83.5             Mono #   1.11             Mono %   7.4             MPV   11.4             Mucous         Occasional       Neutrophils, Abs   11.94             Neutrophils Relative   79.5             NITRITE UA         Negative       Non-HDL Cholesterol   95             nRBC   0.0             NT-proBNP                NUCLEATED RBC ABSOLUTE   0.00             Blood, UA         Moderate       pH, UA         5.0       Phosphorus Level               Platelet Count   147             Potassium   4.2             PROTEIN TOTAL   8.1             Protein, UA         100       PT               RBC   5.26             RBC, UA         2       RDW   14.7             Sodium   142             Spec Grav UA         1.020       Squamous Epithelial Cells, UA         Occasional       Triglycerides   73  Comment:   Normal:  <150 mg/dL  Borderline High: 150-199 mg/dL  High:   200-499 mg/dL  Very High:  >=500             Troponin I High Sensitivity 3,123.1       5,452.7         TSH               UROBILINOGEN UA         0.2       VLDL Cholesterol Reynaldo   15             WBC, UA         1       WBC   15.03                                    Significant Imaging: Echocardiogram: Transthoracic echo (TTE) complete (Cupid Only): No results found for this or any previous visit. and X-Ray: CXR: X-Ray Chest 1 View (CXR):   Results for orders placed or performed during the hospital encounter of 06/01/24   X-Ray Chest 1 View    Narrative    EXAMINATION:  XR CHEST 1 VIEW    CLINICAL HISTORY:  Weakness    TECHNIQUE:  Portable one view    COMPARISON:  None    FINDINGS:  The cardiomediastinal silhouette is within normal limits. The lungs are clear.  Punctate probable granulomatous calcifications are noted throughout the lungs bilaterally.  There is no pneumothorax or pleural effusion.    There is no acute osseous or soft tissue abnormality.  Somewhat prominent degenerative changes are noted within both shoulders.      Impression    No acute cardiopulmonary process.    Place of service: [facility]      Electronically signed by: Khoi Haynes  Date:    06/01/2024  Time:    21:19    and X-Ray Chest PA and Lateral (CXR): No results found for this visit on 06/01/24.  Assessment and Plan:     * Type 2 MI (myocardial infarction)  - Likely secondary to demand  ischemia and patient is without chest pain or palpitations  - KACY score  and Heart Score with moderate risk  - Troponin trending down from 5K to 3K with ECG showing NSR with no acute ST segment changes  - CXR with no acute finding  - Pending Echo  - Telemetry monitoring  - Recommend medical management for Type 2 MI  - Primary care to mange other medical issues such as rhabdomyolysis and renal injury  Plan to wait on Echo to determine plan care after discussing with patient and family members    Non-traumatic rhabdomyolysis  Manage by primary team    PARK (acute kidney injury)  Manage by primary team    Lactic acidosis  Down trending  Manage by primary team        VTE Risk Mitigation (From admission, onward)           Ordered     enoxaparin injection 60 mg  Every 24 hours         06/02/24 1159     Reason for No Pharmacological VTE Prophylaxis  Once        Comments: ACS protocol   Question:  Reasons:  Answer:  Physician Provided (leave comment)    06/01/24 2217     Place KELI hose  Until discontinued         06/01/24 2217     IP VTE HIGH RISK PATIENT  Once         06/01/24 2216     Place sequential compression device  Until discontinued         06/01/24 2216                    Thank you for your consult. I will follow-up with patient. Please contact us if you have any additional questions.    Raj Nails MD  Cardiology   [facility]

## 2024-06-02 NOTE — SUBJECTIVE & OBJECTIVE
Past Medical History:   Diagnosis Date    Chronic low back pain     Chronic pain of right knee     Mixed hyperlipidemia     Syncope     Vitamin D deficiency        History reviewed. No pertinent surgical history.    Review of patient's allergies indicates:  No Known Allergies    No current facility-administered medications on file prior to encounter.     Current Outpatient Medications on File Prior to Encounter   Medication Sig    multivitamin (THERAGRAN) per tablet Take 1 tablet by mouth once daily.     Family History    None       Tobacco Use    Smoking status: Never    Smokeless tobacco: Never   Substance and Sexual Activity    Alcohol use: Not on file    Drug use: Never    Sexual activity: Not on file     Review of Systems   Constitutional:  Positive for activity change, appetite change, chills and fatigue. Negative for diaphoresis, fever and unexpected weight change.   HENT:  Negative for congestion, drooling, ear discharge, ear pain, postnasal drip, sinus pressure, sinus pain, sneezing and sore throat.    Eyes:  Negative for discharge, redness, itching and visual disturbance.   Respiratory:  Negative for cough and shortness of breath.    Cardiovascular:  Positive for palpitations. Negative for chest pain and leg swelling.   Gastrointestinal:  Positive for constipation. Negative for abdominal pain, blood in stool, diarrhea, nausea and vomiting.   Genitourinary:  Positive for decreased urine volume. Negative for difficulty urinating, dysuria, flank pain, frequency, hematuria and urgency.   Musculoskeletal:  Positive for gait problem. Negative for arthralgias, back pain, joint swelling, myalgias, neck pain and neck stiffness.   Skin:  Negative for rash.   Neurological:  Positive for weakness. Negative for dizziness, syncope and headaches.   Psychiatric/Behavioral:  Negative for agitation, behavioral problems, confusion and decreased concentration.      Objective:     Vital Signs (Most Recent):  Temp: 98.1 °F  (36.7 °C) (06/01/24 2032)  Pulse: 81 (06/01/24 2103)  Resp: (!) 22 (06/01/24 2131)  BP: 96/63 (06/01/24 2131)  SpO2: 100 % (06/01/24 2103) Vital Signs (24h Range):  Temp:  [98.1 °F (36.7 °C)] 98.1 °F (36.7 °C)  Pulse:  [81-84] 81  Resp:  [12-22] 22  SpO2:  [96 %-100 %] 100 %  BP: (91-96)/(59-63) 96/63     Weight: 63.5 kg (140 lb)  Body mass index is 23.3 kg/m².     Physical Exam  Vitals and nursing note reviewed.   Constitutional:       General: She is not in acute distress.     Appearance: Normal appearance. She is normal weight. She is not ill-appearing.   HENT:      Head: Normocephalic and atraumatic.      Nose: No congestion or rhinorrhea.      Mouth/Throat:      Mouth: Mucous membranes are dry.      Pharynx: Oropharynx is clear.   Eyes:      Extraocular Movements: Extraocular movements intact and EOM normal.      Conjunctiva/sclera: Conjunctivae normal.      Pupils: Pupils are equal, round, and reactive to light.   Cardiovascular:      Rate and Rhythm: Normal rate and regular rhythm.      Pulses: Normal pulses.      Heart sounds: Normal heart sounds. No murmur heard.  Pulmonary:      Effort: Pulmonary effort is normal. No respiratory distress.      Breath sounds: Normal breath sounds. No wheezing.   Abdominal:      General: Bowel sounds are normal. There is no distension.      Palpations: Abdomen is soft.      Tenderness: There is no abdominal tenderness.   Musculoskeletal:      Cervical back: Normal range of motion and neck supple. No rigidity or tenderness.      Right lower leg: No edema.      Left lower leg: No edema.   Skin:     General: Skin is warm and dry.      Capillary Refill: Capillary refill takes 2 to 3 seconds.   Neurological:      General: No focal deficit present.      Mental Status: She is alert and oriented to person, place, and time.   Psychiatric:         Mood and Affect: Mood normal.         Behavior: Behavior normal.         Thought Content: Thought content normal.              CRANIAL  NERVES     CN I  cranial nerve I not tested    CN III, IV, VI   Pupils are equal, round, and reactive to light.  Extraocular motions are normal.     CN V   Facial sensation intact.   Right corneal reflex: normal  Left corneal reflex: normal    CN VII   Right facial weakness: none  Left facial weakness: none    CN VIII   Hearing: intact       Significant Labs: All pertinent labs within the past 24 hours have been reviewed.    Significant Imaging: I have reviewed all pertinent imaging results/findings within the past 24 hours.

## 2024-06-02 NOTE — NURSING
At 0915 I called ER and spoke to Janiya the Charge RN to see if she could come and stick pt for lab using the ultrasound, the asked for me to call a RN in ICU south, Marielos RN came around 0930 and a lady from [facility] to stick the pt for blood, they both were unable to get blood. Will contact MD.

## 2024-06-02 NOTE — PROGRESS NOTES
[facility]  [facility] Medicine  Progress Note    Patient Name: Steve Castro  MRN: [id number]  Patient Class: IP- Inpatient   Admission Date: 6/1/2024  Length of Stay: 1 days  Attending Physician: Chris Sheridan MD  Primary Care Provider: Yudi, Primary Doctor        Subjective:     Principal Problem:Non-traumatic rhabdomyolysis        HPI:  Patient is a 89 yo female with no significant PMH. She presented to the [facility] ED with complaints of frequent falls. She reported that she has fallen at least twice since yesterday. She said that the first time she fell yesterday was when she was making breakfast and fell down. She said that she felt like she lost her balance and fell. She denied any dizziness, black out, diaphoresis, chest pain, SOB, palpitations, or lightheadedness before she fell. She denied hitting her head during the fall or loss of consciousness.  She reported that she has  not been eating or drinking well for last few days/weeks. Her urine output has been decreased that she contributed to decreased food intake.She has decreased appetite. She denied any chills, fever, myalgias, gastrointestinal or genitourinary symptoms.   She lives alone at her home and uses walker to move around. She is independent in ADLs. Her neighbors and [employer] members keep check on her.  Her neighbor was at the bedside and told that she has fallen at least 6-7 times since 2024. They found her lying down on floor yesterday after fall and had to help get up from the floor.  ED course:  Vitals:  P        BP         Temp      pO2  81 91/61     98.1°F      96  %  Labs:  CBC  13.51 12.5 163    37.4         BMP  144 113 28 132   4.4 25 1.45       Troponin was 5582.6  Lactate : 3.5  EKG: NSR no ST segment changes            Overview/Hospital Course:  6/2- Echocardiogram pending. Cardiology consulted, do not recommend any intervention at this point, changed heparin to weight based Lovenox, continue aspirin.     Interval  History: Patient seen and examined at the bedside, denies chest pain or shortness of breath. Reports feeling better in terms of weakness.     Review of Systems   All other systems reviewed and are negative.    Objective:     Vital Signs (Most Recent):  Temp: 97.4 °F (36.3 °C) (06/02/24 1205)  Pulse: 72 (06/02/24 1205)  Resp: 18 (06/02/24 1205)  BP: 103/71 (06/02/24 1205)  SpO2: 96 % (06/02/24 1205) Vital Signs (24h Range):  Temp:  [97.4 °F (36.3 °C)-98.1 °F (36.7 °C)] 97.4 °F (36.3 °C)  Pulse:  [67-84] 72  Resp:  [12-27] 18  SpO2:  [93 %-100 %] 96 %  BP: ()/(57-71) 103/71     Weight: 62.1 kg (137 lb)  Body mass index is 22.8 kg/m².    Intake/Output Summary (Last 24 hours) at 6/2/2024 1233  Last data filed at 6/2/2024 0511  Gross per 24 hour   Intake 58.14 ml   Output --   Net 58.14 ml         Physical Exam  Vitals and nursing note reviewed.   Constitutional:       Comments: Frail appearing   HENT:      Head: Normocephalic and atraumatic.      Mouth/Throat:      Mouth: Mucous membranes are moist.   Eyes:      Extraocular Movements: Extraocular movements intact.      Pupils: Pupils are equal, round, and reactive to light.   Cardiovascular:      Rate and Rhythm: Normal rate and regular rhythm.   Pulmonary:      Effort: Pulmonary effort is normal.      Breath sounds: Normal breath sounds.   Abdominal:      General: Bowel sounds are normal.      Palpations: Abdomen is soft.   Musculoskeletal:         General: Normal range of motion.      Cervical back: Normal range of motion and neck supple.   Neurological:      General: No focal deficit present.      Mental Status: She is alert and oriented to person, place, and time. Mental status is at baseline.   Psychiatric:         Mood and Affect: Mood normal.         Behavior: Behavior normal.             Significant Labs: All pertinent labs within the past 24 hours have been reviewed.    Significant Imaging: I have reviewed all pertinent imaging results/findings within the  past 24 hours.    Assessment/Plan:      * Non-traumatic rhabdomyolysis  Patient presented to the hospital with the complaints of frequent falls. At the time of presentation, her CK levels were elevated.  Patient apparently was lying on the floor after fall for indeterminate length of time.  Continue IV hydration.   Monitor CK levels     Type 2 MI (myocardial infarction)  Patient found to have Type 2 MI after presenting with:     KACY Risk Score: 75 (intermediate risk)  Wells score: 0  ECG: NSR with no acute ST segment changes  Troponin significantly elevated however trend is flat.    Troponin:   Recent Labs   Lab 06/01/24  2238   TROPONINIHS 5,452.7*   ECHO pending  Weight based with Lovenox for ACS protocol.  Consulted cardiology and appreciate recommendations.    Holding off statin due to rabdomyolysis  Monitor on tele.         PARK (acute kidney injury)  Patient with acute kidney injury likely due to pre-renal azotemia due to dehydration and rhabdomyolysis. PARK is currently improving with IV hydration which will be continued.  Baseline creatinine unknown - Labs reviewed- Renal function/electrolytes with Estimated Creatinine Clearance: 22.3 mL/min (A) (based on SCr of 1.45 mg/dL (H)). according to latest data.   Monitor urine output and serial BMP and adjust therapy as needed.   Avoid nephrotoxins and renally dose meds for GFR listed above.        Lactic acidosis  Patient presented with elevated serum lactic acid. It was 3.5 at presentation and reduced to 2.7 after IV hydration.    Latest lactate reviewed-  Recent Labs   Lab 06/01/24  2238   LACTATE 2.7*       - blood cultures from two sites pending  - hydration via IV LR  - no source of infection was detected      Debility  Patient with Acute on chronic debility due to age-related physical debility. Latest AMPAC and GEMS scores have not been reviewed. Evaluation for etiology is complete. Plan includes progressive mobility protocol initated, PT/OT consulted, and  fall precautions in place.      VTE Risk Mitigation (From admission, onward)           Ordered     enoxaparin injection 60 mg  Every 24 hours         06/02/24 1159     Reason for No Pharmacological VTE Prophylaxis  Once        Comments: ACS protocol   Question:  Reasons:  Answer:  Physician Provided (leave comment)    06/01/24 2217     Place KELI hose  Until discontinued         06/01/24 2217     IP VTE HIGH RISK PATIENT  Once         06/01/24 2216     Place sequential compression device  Until discontinued         06/01/24 2216                    Discharge Planning   ARTHUR: 6/3/2024     Code Status: Full Code   Is the patient medically ready for discharge?:     Reason for patient still in hospital (select all that apply): Patient trending condition and PT / OT recommendations                 LINDA GIMENEZ MD  Department of Hospital Medicine   [facility]

## 2024-06-02 NOTE — HPI
Patient is a 89 yo female with no significant PMH. She presented to the [facility] ED with complaints of frequent falls. She reported that she has fallen at least twice since yesterday. She said that the first time she fell yesterday was when she was making breakfast and fell down. She said that she felt like she lost her balance and fell. She denied any dizziness, black out, diaphoresis, chest pain, SOB, palpitations, or lightheadedness before she fell. She denied hitting her head during the fall or loss of consciousness.    She reported that she has  not been eating or drinking well for last few days/weeks. Her urine output has been decreased that she contributed to decreased food intake.She has decreased appetite. She denied any chills, fever, myalgias, gastrointestinal or genitourinary symptoms.     She lives alone at her home and uses walker to move around. She is independent in ADLs. Her neighbors and [employer] members keep check on her.    Her neighbor was at the bedside and told that she has fallen at least 6-7 times since 2024. They found her lying down on floor yesterday after fall and had to help get up from the floor.    ED course:  Vitals:  P        BP         Temp      pO2  81        91/61     98.1°F      96  %  Labs:  CBC  13.51 12.5 163    37.4          BMP  144 113 28 132   4.4 25 1.45       Troponin was 5582.6  Lactate : 3.5  EKG: NSR no ST segment changes

## 2024-06-02 NOTE — PT/OT/SLP EVAL
"Physical Therapy Evaluation and Treatment    Patient Name: Steve Castro   MRN: [id number]  Recent Surgery: * No surgery found *      Recommendations:     Discharge Recommendations: Moderate Intensity Therapy (to low intensity depending on rate of recovery and level of physical assist available)   Discharge Equipment Recommendations:  (to be determined)   Barriers to discharge: Increased level of assist, Decreased caregiver support, and Ongoing medical treatment    Assessment:     Steve Castro is a 89 y.o. female admitted with a medical diagnosis of Type 2 MI (myocardial infarction). She presents with the following impairments/functional limitations: weakness, impaired endurance, impaired self care skills, impaired functional mobility, gait instability, impaired balance, decreased upper extremity function, decreased lower extremity function, decreased safety awareness, impaired cardiopulmonary response to activity.     Patient demonstrates a significant decline in functional mobility from reported baseline and is not safe to return to independent living situation at this time. Unless patient improves drastically over the next few days, swing bed is recommended.     Rehab Prognosis: Good; patient would benefit from acute PT services to address these deficits and reach maximum level of function.    Plan:     During this hospitalization, patient to be seen 5 x/week to address the above listed problems via gait training, therapeutic activities, therapeutic exercises    Plan of Care Expires: 07/02/24    Subjective     Chief Complaint: Type 2 MI (myocardial infarction) ; rhabdo  Patient Comments/Goals: "I just lost my balance trying to vacuum and then I fell." Patient admitted to several mechanical falls since January.  Pain/Comfort:  Pain Rating 1: 0/10  Pain Rating Post-Intervention 1: 0/10    Social History:  Living Environment: Patient lives alone in a single story home with number of outside stair(s): 1 shallow step  " neighbors an friends check on patient daily as children all live out of state and granddaughter is a ..  Prior Level of Function: Prior to admission, patient was modified independent with ADLs using rollator for mobility; multiple falls since January  Equipment Used at Home: rollator, cane, quad  DME owned (not currently used): none  Assistance Upon Discharge: friend(s) and neighbors and home health vs swing bed staff    Objective:     Communicated with Tolu Patricia RN prior to session. Patient found supine with PureWick, telemetry, peripheral IV upon PT entry to room.    General Precautions: Standard, fall   Orthopedic Precautions: N/A   Braces: N/A    Respiratory Status: Room air    Exams:  Cognition: Patient is oriented to Person, Place, Time, and questionable insight into deficits and safety concerns; poor decision making  RLE ROM: WFL  RLE Strength: Deficits: 4/5  LLE ROM: WFL  LLE Strength: Deficits: 4/5  Sensation:    -       Intact    Functional Mobility:  Gait belt applied - Yes  Bed Mobility  Supine to Sit: minimum assistance for LE management, trunk management, and increased time and effort; HOB elevated  Transfers  Sit to Stand: minimum assistance, of 2 persons, and moderate assist x 1 depending on surface height and patient fatigue with rolling walker and with cues for hand placement and foot placement  Bed to Chair: minimum assistance and of 2 persons with rolling walker and with cues for hand placement and foot placement using Step Transfer  Toilet Transfer (BSC): minimum assistance and of 2 persons with rolling walker and with cues for hand placement and foot placement using Step Transfer; dependent for hygiene and donning brief  Gait  Patient ambulated 15' with rolling walker and minimum assistance and of 2 persons. Patient demonstrates occasional unsteady gait, decreased step length, decreased weight shift, decreased foot clearance, flexed posture, decreased adriana, and  severe thoracic kyphosis . Easy fatigue. All lines remained intact throughout ambulation trail.  Balance  Sitting: stand by assistance  Standing: minimum assistance and of 2 persons with RW      Therapeutic Activities and Exercises:   Patient educated on role of acute care PT and PT POC, safety while in hospital including calling nurse for mobility, and call light usage  Patient educated about importance of OOB mobility and remaining up in chair most of the day.  Benefit of swing bed    AM-PAC 6 CLICK MOBILITY  Total Score:14    Patient left up in chair with all lines intact, call button in reach, and RN notified.    GOALS:   Multidisciplinary Problems       Physical Therapy Goals          Problem: Physical Therapy    Goal Priority Disciplines Outcome Goal Variances Interventions   Physical Therapy Goal     PT, PT/OT Progressing     Description: Short Term Goals to be met by: 2024    Patient will increase functional independence with mobility by performin. Supine to sit with independently  2. Sit to stand transfer with independently using Rolling walker  3. Bed to chair transfer with independently using Rolling walker  4. Gait  x 100 feet with independently using Rolling walker  5. Lower extremity exercise program x30 reps per handout, with assistance as needed    Long Term Goals to be met by: 2024    Pt will regain full independent functional mobility with rollator walker to return to home situation and prior activities of daily living.                        History:     Past Medical History:   Diagnosis Date    Chronic low back pain     Chronic pain of right knee     Mixed hyperlipidemia     Syncope     Vitamin D deficiency        History reviewed. No pertinent surgical history.    Time Tracking:     PT Received On: 24  PT Start Time: 857  PT Stop Time: 943  PT Total Time (min): 46 min     Billable Minutes: Evaluation low complexity    2024

## 2024-06-02 NOTE — ASSESSMENT & PLAN NOTE
- Likely secondary to demand ischemia and patient is without chest pain or palpitations  - KACY score  and Heart Score with moderate risk  - Troponin trending down from 5K to 3K with ECG showing NSR with no acute ST segment changes  - CXR with no acute finding  - Pending Echo  - Telemetry monitoring  - Recommend medical management for Type 2 MI  - Primary care to mange other medical issues such as rhabdomyolysis and renal injury  Plan to wait on Echo to determine plan care after discussing with patient and family members

## 2024-06-02 NOTE — SUBJECTIVE & OBJECTIVE
Past Medical History:   Diagnosis Date    Chronic low back pain     Chronic pain of right knee     Mixed hyperlipidemia     Syncope     Vitamin D deficiency        History reviewed. No pertinent surgical history.    Review of patient's allergies indicates:  No Known Allergies    No current facility-administered medications on file prior to encounter.     Current Outpatient Medications on File Prior to Encounter   Medication Sig    multivitamin (THERAGRAN) per tablet Take 1 tablet by mouth once daily.     Family History    None       Tobacco Use    Smoking status: Never    Smokeless tobacco: Never   Substance and Sexual Activity    Alcohol use: Not on file    Drug use: Never    Sexual activity: Not on file     Review of Systems   Constitutional: Negative for diaphoresis and fever.   HENT:  Negative for hoarse voice.    Eyes:  Negative for visual disturbance and visual halos.   Cardiovascular:  Negative for chest pain and leg swelling.   Respiratory:  Negative for shortness of breath.    Gastrointestinal:  Negative for abdominal pain.   Psychiatric/Behavioral:  Negative for altered mental status, depression and hallucinations.      Objective:     Vital Signs (Most Recent):  Temp: 97.4 °F (36.3 °C) (06/02/24 1205)  Pulse: 72 (06/02/24 1205)  Resp: 18 (06/02/24 1205)  BP: 103/71 (06/02/24 1205)  SpO2: 96 % (06/02/24 1205) Vital Signs (24h Range):  Temp:  [97.4 °F (36.3 °C)-98.1 °F (36.7 °C)] 97.4 °F (36.3 °C)  Pulse:  [67-84] 72  Resp:  [12-27] 18  SpO2:  [93 %-100 %] 96 %  BP: ()/(57-71) 103/71     Weight: 62.1 kg (137 lb)  Body mass index is 22.8 kg/m².    SpO2: 96 %         Intake/Output Summary (Last 24 hours) at 6/2/2024 1543  Last data filed at 6/2/2024 0511  Gross per 24 hour   Intake 58.14 ml   Output --   Net 58.14 ml       Lines/Drains/Airways       Peripheral Intravenous Line  Duration                  Peripheral IV - Single Lumen 06/01/24 20 G Anterior;Right Forearm 1 day         Peripheral IV - Single  Lumen 06/01/24 2300 22 G Anterior;Left;Proximal Forearm <1 day                     Physical Exam  Vitals and nursing note reviewed.   Constitutional:       General: She is not in acute distress.     Appearance: Normal appearance. She is not ill-appearing.   HENT:      Head: Normocephalic and atraumatic.      Right Ear: External ear normal.      Left Ear: External ear normal.   Eyes:      General:         Right eye: No discharge.         Left eye: No discharge.      Conjunctiva/sclera: Conjunctivae normal.   Cardiovascular:      Rate and Rhythm: Normal rate and regular rhythm.      Pulses: Normal pulses.      Heart sounds: Murmur heard.   Pulmonary:      Effort: Pulmonary effort is normal.      Breath sounds: Normal breath sounds. No wheezing, rhonchi or rales.   Abdominal:      Palpations: Abdomen is soft.   Musculoskeletal:      Cervical back: Neck supple.      Right lower leg: No edema.      Left lower leg: No edema.   Skin:     General: Skin is warm.   Neurological:      Mental Status: She is alert and oriented to person, place, and time.   Psychiatric:         Mood and Affect: Mood normal.         Behavior: Behavior normal.          Significant Labs:   Recent Lab Results  (Last 5 results in the past 24 hours)        06/02/24  0556   06/02/24  0355   06/02/24  0051   06/01/24  2238   06/01/24  2233        Albumin/Globulin Ratio   0.9             Albumin   3.8             ALP   121             ALT   95             Anion Gap   12             Appearance, UA         Clear       PTT               AST   253             Bacteria, UA         Occasional       Baso #   0.02             Basophil %   0.1             Bilirubin (UA)         Negative       BILIRUBIN TOTAL   1.5             BUN   27             BUN/CREAT RATIO   20             Calcium   9.9             Chloride   113             CHOL/HDLC Ratio   2.5             Cholesterol Total   158  Comment:   <200 mg/dL:  Desirable  200-240 mg/dL: Borderline High  >240  mg/dL:  High             CO2   21             Color, UA         Yellow       SARS COV-2 MOLECULAR         Negative       CPK   5,301             Creatinine   1.32             Differential Method   Auto             eGFR   38             Eos #   0.00             Eos %   0.0             Estimated Avg Glucose               Globulin, Total   4.3             Glucose   123             Glucose, UA         Negative       HDL   63  Comment:   <40 mg/dL: Low HDL  40-60 mg/dL: Normal  >60 mg/dL: Desirable             Hematocrit   43.9             Hemoglobin   14.6             Hemoglobin A1C External               Hyaline Casts, UA         0-2       Immature Grans (Abs)   0.08             Immature Granulocytes   0.5             INR               Ketones, UA         15       Lactic Acid Level 2.9  Comment: A repeat order for Lactic Acid has been placed for collection in 2 hours.   4.9  Comment: A repeat order for Lactic Acid has been placed for collection in 2 hours.   2.6  Comment: A repeat order for Lactic Acid has been placed for collection in 2 hours.   2.7  Comment: A repeat order for Lactic Acid has been placed for collection in 2 hours.         LDL Calculated   80  Comment: Unable to calculate due to one of the following values:  Cholesterol <5  HDL Cholesterol <5  Triglycerides <10 or >400             LDL/HDL Ratio   1.3  Comment: Unable to calculate due to one of the following values:  Cholesterol <5  HDL Cholesterol <5  Triglycerides <10 or >400             Leukocyte Esterase, UA         Negative       Lymph #   1.88             Lymph %   12.5             Magnesium                MCH   27.8             MCHC   33.3             MCV   83.5             Mono #   1.11             Mono %   7.4             MPV   11.4             Mucous         Occasional       Neutrophils, Abs   11.94             Neutrophils Relative   79.5             NITRITE UA         Negative       Non-HDL Cholesterol   95             nRBC   0.0              NT-proBNP               NUCLEATED RBC ABSOLUTE   0.00             Blood, UA         Moderate       pH, UA         5.0       Phosphorus Level               Platelet Count   147             Potassium   4.2             PROTEIN TOTAL   8.1             Protein, UA         100       PT               RBC   5.26             RBC, UA         2       RDW   14.7             Sodium   142             Spec Grav UA         1.020       Squamous Epithelial Cells, UA         Occasional       Triglycerides   73  Comment:   Normal:  <150 mg/dL  Borderline High: 150-199 mg/dL  High:   200-499 mg/dL  Very High:  >=500             Troponin I High Sensitivity 3,123.1       5,452.7         TSH               UROBILINOGEN UA         0.2       VLDL Cholesterol Reynaldo   15             WBC, UA         1       WBC   15.03                                    Significant Imaging: Echocardiogram: Transthoracic echo (TTE) complete (Cupid Only): No results found for this or any previous visit. and X-Ray: CXR: X-Ray Chest 1 View (CXR):   Results for orders placed or performed during the hospital encounter of 06/01/24   X-Ray Chest 1 View    Narrative    EXAMINATION:  XR CHEST 1 VIEW    CLINICAL HISTORY:  Weakness    TECHNIQUE:  Portable one view    COMPARISON:  None    FINDINGS:  The cardiomediastinal silhouette is within normal limits. The lungs are clear.  Punctate probable granulomatous calcifications are noted throughout the lungs bilaterally.  There is no pneumothorax or pleural effusion.    There is no acute osseous or soft tissue abnormality.  Somewhat prominent degenerative changes are noted within both shoulders.      Impression    No acute cardiopulmonary process.    Place of service: [facility]      Electronically signed by: Khoi Haynes  Date:    06/01/2024  Time:    21:19    and X-Ray Chest PA and Lateral (CXR): No results found for this visit on 06/01/24.

## 2024-06-02 NOTE — ASSESSMENT & PLAN NOTE
Patient presented to the hospital with the complaints of frequent falls. At the time of presentation, her CK levels were elevated.  Patient apparently was lying on the floor after fall for indeterminate length of time.  Continue IV hydration.   Monitor CK levels

## 2024-06-02 NOTE — HPI
Patient is a 89 yo female with no significant PMH. She presented to the [facility] ED with complaints of frequent falls. She reported that she has fallen at least twice since yesterday. She said that the first time she fell yesterday was when she was making breakfast and fell down. She said that she felt like she lost her balance and fell. She denied any dizziness, black out, diaphoresis, chest pain, SOB, palpitations, or lightheadedness before she fell. She denied hitting her head during the fall or loss of consciousness.  She reported that she has  not been eating or drinking well for last few days/weeks. Her urine output has been decreased that she contributed to decreased food intake.She has decreased appetite. She denied any chills, fever, myalgias, gastrointestinal or genitourinary symptoms.   She lives alone at her home and uses walker to move around. She is independent in ADLs. Her neighbors and [employer] members keep check on her.  Her neighbor was at the bedside and told that she has fallen at least 6-7 times since 2024. They found her lying down on floor yesterday after fall and had to help get up from the floor.  ED course:  Vitals:  P        BP         Temp      pO2  81 91/61     98.1°F      96  %  Labs:  CBC  13.51 12.5 163    37.4         BMP  144 113 28 132   4.4 25 1.45       Troponin was 5582.6  Lactate : 3.5  EKG: NSR no ST segment changes

## 2024-06-02 NOTE — ASSESSMENT & PLAN NOTE
Patient with acute kidney injury/acute renal failure likely due to pre-renal azotemia due to dehydration PARK is currently stable. Baseline creatinine unknown - Labs reviewed- Renal function/electrolytes with Estimated Creatinine Clearance: 22.3 mL/min (A) (based on SCr of 1.45 mg/dL (H)). according to latest data. Monitor urine output and serial BMP and adjust therapy as needed. Avoid nephrotoxins and renally dose meds for GFR listed above.  - Monitor daily CMP

## 2024-06-02 NOTE — H&P
[facility] - Emergency Department  Hospital Medicine  History & Physical    Patient Name: Steve Castro  MRN: [id number]  Patient Class: IP- Inpatient  Admission Date: 6/1/2024  Attending Physician: Francisco J Brown MD   Primary Care Provider: No primary care provider on file.         Patient information was obtained from patient, relative(s), past medical records, and ER records.     Subjective:     Principal Problem:Type 2 MI (myocardial infarction)    Chief Complaint:   Chief Complaint   Patient presents with    Fatigue        HPI: Patient is a 89 yo female with no significant PMH. She presented to the [facility] ED with complaints of frequent falls. She reported that she has fallen at least twice since yesterday. She said that the first time she fell yesterday was when she was making breakfast and fell down. She said that she felt like she lost her balance and fell. She denied any dizziness, black out, diaphoresis, chest pain, SOB, palpitations, or lightheadedness before she fell. She denied hitting her head during the fall or loss of consciousness.  She reported that she has  not been eating or drinking well for last few days/weeks. Her urine output has been decreased that she contributed to decreased food intake.She has decreased appetite. She denied any chills, fever, myalgias, gastrointestinal or genitourinary symptoms.   She lives alone at her home and uses walker to move around. She is independent in ADLs. Her neighbors and [employer] members keep check on her.  Her neighbor was at the bedside and told that she has fallen at least 6-7 times since 2024. They found her lying down on floor yesterday after fall and had to help get up from the floor.  ED course:  Vitals:  P        BP         Temp      pO2  81 91/61     98.1°F      96  %  Labs:  CBC  13.51 12.5 163    37.4         BMP  144 113 28 132   4.4 25 1.45       Troponin was 5582.6  Lactate : 3.5  EKG: NSR no ST segment changes            Past Medical  History:   Diagnosis Date    Chronic low back pain     Chronic pain of right knee     Mixed hyperlipidemia     Syncope     Vitamin D deficiency        History reviewed. No pertinent surgical history.    Review of patient's allergies indicates:  No Known Allergies    No current facility-administered medications on file prior to encounter.     Current Outpatient Medications on File Prior to Encounter   Medication Sig    multivitamin (THERAGRAN) per tablet Take 1 tablet by mouth once daily.     Family History    None       Tobacco Use    Smoking status: Never    Smokeless tobacco: Never   Substance and Sexual Activity    Alcohol use: Not on file    Drug use: Never    Sexual activity: Not on file     Review of Systems   Constitutional:  Positive for activity change, appetite change, chills and fatigue. Negative for diaphoresis, fever and unexpected weight change.   HENT:  Negative for congestion, drooling, ear discharge, ear pain, postnasal drip, sinus pressure, sinus pain, sneezing and sore throat.    Eyes:  Negative for discharge, redness, itching and visual disturbance.   Respiratory:  Negative for cough and shortness of breath.    Cardiovascular:  Positive for palpitations. Negative for chest pain and leg swelling.   Gastrointestinal:  Positive for constipation. Negative for abdominal pain, blood in stool, diarrhea, nausea and vomiting.   Genitourinary:  Positive for decreased urine volume. Negative for difficulty urinating, dysuria, flank pain, frequency, hematuria and urgency.   Musculoskeletal:  Positive for gait problem. Negative for arthralgias, back pain, joint swelling, myalgias, neck pain and neck stiffness.   Skin:  Negative for rash.   Neurological:  Positive for weakness. Negative for dizziness, syncope and headaches.   Psychiatric/Behavioral:  Negative for agitation, behavioral problems, confusion and decreased concentration.      Objective:     Vital Signs (Most Recent):  Temp: 98.1 °F (36.7 °C)  (06/01/24 2032)  Pulse: 81 (06/01/24 2103)  Resp: (!) 22 (06/01/24 2131)  BP: 96/63 (06/01/24 2131)  SpO2: 100 % (06/01/24 2103) Vital Signs (24h Range):  Temp:  [98.1 °F (36.7 °C)] 98.1 °F (36.7 °C)  Pulse:  [81-84] 81  Resp:  [12-22] 22  SpO2:  [96 %-100 %] 100 %  BP: (91-96)/(59-63) 96/63     Weight: 63.5 kg (140 lb)  Body mass index is 23.3 kg/m².     Physical Exam  Vitals and nursing note reviewed.   Constitutional:       General: She is not in acute distress.     Appearance: Normal appearance. She is normal weight. She is not ill-appearing.   HENT:      Head: Normocephalic and atraumatic.      Nose: No congestion or rhinorrhea.      Mouth/Throat:      Mouth: Mucous membranes are dry.      Pharynx: Oropharynx is clear.   Eyes:      Extraocular Movements: Extraocular movements intact and EOM normal.      Conjunctiva/sclera: Conjunctivae normal.      Pupils: Pupils are equal, round, and reactive to light.   Cardiovascular:      Rate and Rhythm: Normal rate and regular rhythm.      Pulses: Normal pulses.      Heart sounds: Normal heart sounds. No murmur heard.  Pulmonary:      Effort: Pulmonary effort is normal. No respiratory distress.      Breath sounds: Normal breath sounds. No wheezing.   Abdominal:      General: Bowel sounds are normal. There is no distension.      Palpations: Abdomen is soft.      Tenderness: There is no abdominal tenderness.   Musculoskeletal:      Cervical back: Normal range of motion and neck supple. No rigidity or tenderness.      Right lower leg: No edema.      Left lower leg: No edema.   Skin:     General: Skin is warm and dry.      Capillary Refill: Capillary refill takes 2 to 3 seconds.   Neurological:      General: No focal deficit present.      Mental Status: She is alert and oriented to person, place, and time.   Psychiatric:         Mood and Affect: Mood normal.         Behavior: Behavior normal.         Thought Content: Thought content normal.              CRANIAL NERVES     CN  I  cranial nerve I not tested    CN III, IV, VI   Pupils are equal, round, and reactive to light.  Extraocular motions are normal.     CN V   Facial sensation intact.   Right corneal reflex: normal  Left corneal reflex: normal    CN VII   Right facial weakness: none  Left facial weakness: none    CN VIII   Hearing: intact       Significant Labs: All pertinent labs within the past 24 hours have been reviewed.    Significant Imaging: I have reviewed all pertinent imaging results/findings within the past 24 hours.  Assessment/Plan:     * Type 2 MI (myocardial infarction)  Patient found to have Type 2 MI after presenting with:     KACY Risk Score: 75 (intermediate risk)  Wells score: 0  ECG:   NSR with no acute ST segment changes  Trending Troponin  ECHO pending  Patient on telemetry  Heparin infusion with normogram for ACS protocol  Consulted cardiology and appreciate recommendations.     Troponin:   Recent Labs   Lab 06/01/24  2238   TROPONINIHS 5,452.7*     Holding off statin due to suspected rabdomyolysis  Metoprolol 25 mg BID        Traumatic rhabdomyolysis    Patient presented to the hospital with the complaints of frequent falls. At the time of presentation, her CK levels were elevated.  Patient apparently was lying on the floor after fall for indeterminate length of time.  Hydration via IV LR  Monitor CK levels closely    Acute renal failure  Patient with acute kidney injury/acute renal failure likely due to pre-renal azotemia due to dehydration  and rhabdomyolysis. PARK is currently stable. Baseline creatinine unknown - Labs reviewed- Renal function/electrolytes with Estimated Creatinine Clearance: 22.3 mL/min (A) (based on SCr of 1.45 mg/dL (H)). according to latest data. Monitor urine output and serial BMP and adjust therapy as needed. Avoid nephrotoxins and renally dose meds for GFR listed above.  - Monitor daily CMP  - avoid nephrotoxic medications      Lactic acidosis  Patient presented with elevated serum  lactic acid. It was 3.5 at presentation and reduced to 2.7 after IV hydration.    Latest lactate reviewed-  Recent Labs   Lab 06/01/24 2238   LACTATE 2.7*       - blood cultures from two sites pending  - hydration via IV LR  - no source of infection was detected      Debility  Patient with Acute on chronic debility due to age-related physical debility. Latest AMPAC and GEMS scores have not been reviewed. Evaluation for etiology is complete. Plan includes progressive mobility protocol initated, PT/OT consulted, and fall precautions in place.      VTE Risk Mitigation (From admission, onward)           Ordered     heparin 25,000 units in dextrose 5% (100 units/ml) IV bolus from bag LOW INTENSITY nomogram - [facility]  Once        Question:  Heparin Infusion Adjustment (DO NOT MODIFY ANSWER)  Answer:  \\[email or web address]\epic\Images\Pharmacy\HeparinInfusions\heparin LOW INTENSITY nomogram for [facility] [id number].pdf    06/01/24 2217     heparin 25,000 units in dextrose 5% 250 mL (100 units/mL) infusion LOW INTENSITY nomogram - [facility]  Continuous        Question:  Begin at (units/kg/hr)  Answer:  12    06/01/24 2217     Reason for No Pharmacological VTE Prophylaxis  Once        Comments: ACS protocol   Question:  Reasons:  Answer:  Physician Provided (leave comment)    06/01/24 2217     heparin 25,000 units in dextrose 5% (100 units/ml) IV bolus from bag LOW INTENSITY nomogram - [facility]  As needed (PRN)        Question:  Heparin Infusion Adjustment (DO NOT MODIFY ANSWER)  Answer:  \\[email or web address]\epic\Images\Pharmacy\HeparinInfusions\heparin LOW INTENSITY nomogram for [facility] [id number]    06/01/24 2217     heparin 25,000 units in dextrose 5% (100 units/ml) IV bolus from bag LOW INTENSITY nomogram - [facility]  As needed (PRN)        Question:  Heparin Infusion Adjustment (DO NOT MODIFY ANSWER)  Answer:  \\[email or web address]\epic\Images\Pharmacy\HeparinInfusions\heparin LOW INTENSITY nomogram for [facility] [id number].pdf    06/01/24 2217     Place KELI hose  Until  discontinued         06/01/24 2217     IP VTE HIGH RISK PATIENT  Once         06/01/24 2216     Place sequential compression device  Until discontinued         06/01/24 2216                                    Leslie Ng MD  Department of Hospital Medicine  [facility] - Emergency Department

## 2024-06-02 NOTE — PROGRESS NOTES
Pharmacist Renal Dose Adjustment Note    Steve Castro is a 89 y.o. female being treated with the medication Lovenox    Patient Data:    Vital Signs (Most Recent):  Temp: 97.6 °F (36.4 °C) (06/02/24 0757)  Pulse: 70 (06/02/24 0810)  Resp: 18 (06/02/24 0757)  BP: 97/65 (06/02/24 0757)  SpO2: 96 % (06/02/24 0810) Vital Signs (72h Range):  Temp:  [97.4 °F (36.3 °C)-98.1 °F (36.7 °C)]   Pulse:  [67-84]   Resp:  [12-27]   BP: (88-99)/(57-66)   SpO2:  [93 %-100 %]      Recent Labs   Lab 06/01/24 2033 06/02/24  0355   CREATININE 1.45* 1.32*     Serum creatinine: 1.32 mg/dL (H) 06/02/24 0355  Estimated creatinine clearance: 24.5 mL/min (A)    Medication:Lovenox dose: 60mg frequency q12hr will be changed to medication:Lovenox dose:60mg frequency:q24hr    Pharmacist's Name: Anton Rueda  Pharmacist's Extension: [phone]

## 2024-06-02 NOTE — NURSING
At 1015 I secure chatted Dr. Sheridan about the issue of Lab or ICU Rns could not get blood for PTT that was due at 0600 this am, awaiting an answer.

## 2024-06-03 PROBLEM — R79.89 ELEVATED TROPONIN LEVEL: Status: ACTIVE | Noted: 2024-06-01

## 2024-06-03 PROBLEM — I35.1 AORTIC VALVE REGURGITATION: Status: ACTIVE | Noted: 2024-06-03

## 2024-06-03 PROBLEM — I50.21 ACUTE SYSTOLIC HEART FAILURE: Status: ACTIVE | Noted: 2024-06-03

## 2024-06-03 LAB
ALBUMIN SERPL BCP-MCNC: 2.5 G/DL (ref 3.5–5)
ALBUMIN/GLOB SERPL: 0.7 {RATIO}
ALP SERPL-CCNC: 98 U/L (ref 55–142)
ALT SERPL W P-5'-P-CCNC: 85 U/L (ref 13–56)
ANION GAP SERPL CALCULATED.3IONS-SCNC: 9 MMOL/L (ref 7–16)
ANISOCYTOSIS BLD QL SMEAR: ABNORMAL
AORTIC ROOT ANNULUS: 2.85 CM
AORTIC VALVE CUSP SEPERATION: 2.01 CM
AST SERPL W P-5'-P-CCNC: 128 U/L (ref 15–37)
AV INDEX (PROSTH): 0.7
AV MEAN GRADIENT: 4 MMHG
AV PEAK GRADIENT: 7 MMHG
AV REGURGITATION PRESSURE HALF TIME: 581.38 MS
AV VALVE AREA BY VELOCITY RATIO: 2.4 CM²
AV VALVE AREA: 2.22 CM²
AV VELOCITY RATIO: 0.76
BASOPHILS # BLD AUTO: 0.03 K/UL (ref 0–0.2)
BASOPHILS NFR BLD AUTO: 0.2 % (ref 0–1)
BILIRUB SERPL-MCNC: 0.7 MG/DL (ref ?–1.2)
BSA FOR ECHO PROCEDURE: 1.69 M2
BUN SERPL-MCNC: 27 MG/DL (ref 7–18)
BUN/CREAT SERPL: 23 (ref 6–20)
CALCIUM SERPL-MCNC: 8.3 MG/DL (ref 8.5–10.1)
CHLORIDE SERPL-SCNC: 110 MMOL/L (ref 98–107)
CK SERPL-CCNC: 961 U/L (ref 26–192)
CO2 SERPL-SCNC: 28 MMOL/L (ref 21–32)
CREAT SERPL-MCNC: 1.2 MG/DL (ref 0.55–1.02)
CRENATED CELLS: ABNORMAL
CV ECHO LV RWT: 0.34 CM
DIFFERENTIAL METHOD BLD: ABNORMAL
DOP CALC AO PEAK VEL: 1.28 M/S
DOP CALC AO VTI: 22.9 CM
DOP CALC LVOT AREA: 3.2 CM2
DOP CALC LVOT DIAMETER: 2.01 CM
DOP CALC LVOT PEAK VEL: 0.97 M/S
DOP CALC LVOT STROKE VOLUME: 50.74 CM3
DOP CALCLVOT PEAK VEL VTI: 16 CM
E WAVE DECELERATION TIME: 379.84 MSEC
E/A RATIO: 0.82
E/E' RATIO: 11.09 M/S
ECHO LV POSTERIOR WALL: 0.7 CM (ref 0.6–1.1)
EGFR (NO RACE VARIABLE) (RUSH/TITUS): 43 ML/MIN/1.73M2
EOSINOPHIL # BLD AUTO: 0.04 K/UL (ref 0–0.5)
EOSINOPHIL NFR BLD AUTO: 0.3 % (ref 1–4)
ERYTHROCYTE [DISTWIDTH] IN BLOOD BY AUTOMATED COUNT: 15 % (ref 11.5–14.5)
FRACTIONAL SHORTENING: 17 % (ref 28–44)
GLOBULIN SER-MCNC: 3.4 G/DL (ref 2–4)
GLUCOSE SERPL-MCNC: 95 MG/DL (ref 74–106)
HCT VFR BLD AUTO: 40 % (ref 38–47)
HGB BLD-MCNC: 13.4 G/DL (ref 12–16)
IMM GRANULOCYTES # BLD AUTO: 0.1 K/UL (ref 0–0.04)
IMM GRANULOCYTES NFR BLD: 0.8 % (ref 0–0.4)
INTERVENTRICULAR SEPTUM: 0.8 CM (ref 0.6–1.1)
IVC DIAMETER: 2.24 CM
LA MAJOR: 2.38 CM
LEFT ATRIUM SIZE: 1.91 CM
LEFT INTERNAL DIMENSION IN SYSTOLE: 3.37 CM (ref 2.1–4)
LEFT VENTRICLE DIASTOLIC VOLUME INDEX: 43.15 ML/M2
LEFT VENTRICLE DIASTOLIC VOLUME: 72.49 ML
LEFT VENTRICLE MASS INDEX: 52 G/M2
LEFT VENTRICLE SYSTOLIC VOLUME INDEX: 27.6 ML/M2
LEFT VENTRICLE SYSTOLIC VOLUME: 46.45 ML
LEFT VENTRICULAR INTERNAL DIMENSION IN DIASTOLE: 4.06 CM (ref 3.5–6)
LEFT VENTRICULAR MASS: 87.92 G
LV LATERAL E/E' RATIO: 10.17 M/S
LV SEPTAL E/E' RATIO: 12.2 M/S
LVOT MG: 1.99 MMHG
LVOT MV: 0.67 CM/S
LYMPHOCYTES # BLD AUTO: 2.51 K/UL (ref 1–4.8)
LYMPHOCYTES NFR BLD AUTO: 18.9 % (ref 27–41)
MCH RBC QN AUTO: 28 PG (ref 27–31)
MCHC RBC AUTO-ENTMCNC: 33.5 G/DL (ref 32–36)
MCV RBC AUTO: 83.5 FL (ref 80–96)
MONOCYTES # BLD AUTO: 0.97 K/UL (ref 0–0.8)
MONOCYTES NFR BLD AUTO: 7.3 % (ref 2–6)
MPC BLD CALC-MCNC: 11.5 FL (ref 9.4–12.4)
MV PEAK A VEL: 0.74 M/S
MV PEAK E VEL: 0.61 M/S
NEUTROPHILS # BLD AUTO: 9.61 K/UL (ref 1.8–7.7)
NEUTROPHILS NFR BLD AUTO: 72.5 % (ref 53–65)
NRBC # BLD AUTO: 0.02 X10E3/UL
NRBC, AUTO (.00): 0.2 %
OHS CV RV/LV RATIO: 1.11 CM
OHS LV EJECTION FRACTION SIMPSONS BIPLANE MOD: 23 %
OHS QRS DURATION: 78 MS
OHS QTC CALCULATION: 464 MS
PISA AR MAX VEL: 3.71 M/S
PISA TR MAX VEL: 2.14 M/S
PLATELET # BLD AUTO: 115 K/UL (ref 150–400)
PLATELET MORPHOLOGY: ABNORMAL
POTASSIUM SERPL-SCNC: 3.5 MMOL/L (ref 3.5–5.1)
PROT SERPL-MCNC: 5.9 G/DL (ref 6.4–8.2)
PV PEAK GRADIENT: 1 MMHG
PV PEAK VELOCITY: 0.56 M/S
RA MAJOR: 4.44 CM
RA PRESSURE ESTIMATED: 15 MMHG
RBC # BLD AUTO: 4.79 M/UL (ref 4.2–5.4)
RIGHT VENTRICULAR END-DIASTOLIC DIMENSION: 4.52 CM
RV TB RVSP: 17 MMHG
SODIUM SERPL-SCNC: 143 MMOL/L (ref 136–145)
TDI LATERAL: 0.06 M/S
TDI SEPTAL: 0.05 M/S
TDI: 0.06 M/S
TR MAX PG: 18 MMHG
TRICUSPID ANNULAR PLANE SYSTOLIC EXCURSION: 1.2 CM
TV REST PULMONARY ARTERY PRESSURE: 33 MMHG
WBC # BLD AUTO: 13.26 K/UL (ref 4.5–11)
Z-SCORE OF LEFT VENTRICULAR DIMENSION IN END DIASTOLE: -1.44
Z-SCORE OF LEFT VENTRICULAR DIMENSION IN END SYSTOLE: 1.18

## 2024-06-03 PROCEDURE — 25000003 PHARM REV CODE 250: Performed by: INTERNAL MEDICINE

## 2024-06-03 PROCEDURE — 36410 VNPNXR 3YR/> PHY/QHP DX/THER: CPT

## 2024-06-03 PROCEDURE — 94761 N-INVAS EAR/PLS OXIMETRY MLT: CPT

## 2024-06-03 PROCEDURE — 36415 COLL VENOUS BLD VENIPUNCTURE: CPT

## 2024-06-03 PROCEDURE — 97110 THERAPEUTIC EXERCISES: CPT | Mod: CO

## 2024-06-03 PROCEDURE — 85025 COMPLETE CBC W/AUTO DIFF WBC: CPT

## 2024-06-03 PROCEDURE — 25000003 PHARM REV CODE 250

## 2024-06-03 PROCEDURE — 36415 COLL VENOUS BLD VENIPUNCTURE: CPT | Performed by: INTERNAL MEDICINE

## 2024-06-03 PROCEDURE — 76937 US GUIDE VASCULAR ACCESS: CPT

## 2024-06-03 PROCEDURE — 97110 THERAPEUTIC EXERCISES: CPT

## 2024-06-03 PROCEDURE — 82550 ASSAY OF CK (CPK): CPT | Performed by: INTERNAL MEDICINE

## 2024-06-03 PROCEDURE — 80053 COMPREHEN METABOLIC PANEL: CPT

## 2024-06-03 PROCEDURE — C1751 CATH, INF, PER/CENT/MIDLINE: HCPCS

## 2024-06-03 PROCEDURE — 11000001 HC ACUTE MED/SURG PRIVATE ROOM

## 2024-06-03 PROCEDURE — 63600175 PHARM REV CODE 636 W HCPCS: Performed by: INTERNAL MEDICINE

## 2024-06-03 PROCEDURE — 99232 SBSQ HOSP IP/OBS MODERATE 35: CPT | Mod: ,,, | Performed by: INTERNAL MEDICINE

## 2024-06-03 PROCEDURE — 97530 THERAPEUTIC ACTIVITIES: CPT

## 2024-06-03 PROCEDURE — 99233 SBSQ HOSP IP/OBS HIGH 50: CPT | Mod: ,,, | Performed by: STUDENT IN AN ORGANIZED HEALTH CARE EDUCATION/TRAINING PROGRAM

## 2024-06-03 RX ORDER — DICLOFENAC SODIUM 10 MG/G
2 GEL TOPICAL DAILY
Status: DISCONTINUED | OUTPATIENT
Start: 2024-06-03 | End: 2024-06-04 | Stop reason: HOSPADM

## 2024-06-03 RX ADMIN — ASPIRIN 81 MG CHEWABLE TABLET 81 MG: 81 TABLET CHEWABLE at 09:06

## 2024-06-03 RX ADMIN — ACETAMINOPHEN 650 MG: 325 TABLET ORAL at 09:06

## 2024-06-03 RX ADMIN — THERA TABS 1 TABLET: TAB at 09:06

## 2024-06-03 RX ADMIN — DICLOFENAC SODIUM TOPICAL GEL, 1% 2 G: 10 GEL TOPICAL at 12:06

## 2024-06-03 RX ADMIN — ENOXAPARIN SODIUM 60 MG: 60 INJECTION SUBCUTANEOUS at 01:06

## 2024-06-03 RX ADMIN — SODIUM CHLORIDE, POTASSIUM CHLORIDE, SODIUM LACTATE AND CALCIUM CHLORIDE: 600; 310; 30; 20 INJECTION, SOLUTION INTRAVENOUS at 10:06

## 2024-06-03 NOTE — PLAN OF CARE
[facility]  Initial Discharge Assessment       Primary Care Provider: No, Primary Doctor    Admission Diagnosis: Fall [W19.XXXA]  General weakness [R53.1]  Chest pain [R07.9]  Type 2 MI (myocardial infarction) [I21.A1]    Admission Date: 6/1/2024  Expected Discharge Date: 6/3/2024         Payor: MEDICARE / Plan: MEDICARE PART A & B / Product Type: Government /     Extended Emergency Contact Information  Primary Emergency Contact: Garland Roque  Mobile Phone: [phone]  Relation: Grandchild  Secondary Emergency Contact: Ethan Garcia  Mobile Phone: [phone]  Relation: Friend    Discharge Plan A: Home with family  Discharge Plan B: Home with family    No Pharmacies Listed    Initial Assessment (most recent)       Adult Discharge Assessment - 06/03/24 1106          Discharge Assessment    Assessment Type Discharge Planning Assessment     Source of Information patient;family     People in Home alone     Do you expect to return to your current living situation? No     Do you have help at home or someone to help you manage your care at home? No     Current cognitive status: Alert/Oriented     Walking or Climbing Stairs Difficulty yes     Walking or Climbing Stairs ambulation difficulty, requires equipment;ambulation difficulty, assistance 1 person     Dressing/Bathing Difficulty no     Equipment Currently Used at Home rollator     Readmission within 30 days? No     Do you currently have service(s) that help you manage your care at home? No     Do you take prescription medications? Yes     Do you have prescription coverage? Yes     Coverage Medicare     Do you have any problems affording any of your prescribed medications? No     Is the patient taking medications as prescribed? yes     Who is going to help you get home at discharge? family     How do you get to doctors appointments? family or friend will provide     Are you on dialysis? No     Do you take coumadin? No     Discharge Plan A Home  with family     Discharge Plan B Home with family     Discharge Plan discussed with: Patient   Great granddaughter       Physical Activity    On average, how many days per week do you engage in moderate to strenuous exercise (like a brisk walk)? 0 days     On average, how many minutes do you engage in exercise at this level? 0 min        Financial Resource Strain    How hard is it for you to pay for the very basics like food, housing, medical care, and heating? Not hard at all        Housing Stability    In the last 12 months, was there a time when you were not able to pay the mortgage or rent on time? No     At any time in the past 12 months, were you homeless or living in a shelter (including now)? No        Transportation Needs    Has the lack of transportation kept you from medical appointments, meetings, work or from getting things needed for daily living? No        Food Insecurity    Within the past 12 months, you worried that your food would run out before you got the money to buy more. Never true     Within the past 12 months, the food you bought just didn't last and you didn't have money to get more. Never true        Stress    Do you feel stress - tense, restless, nervous, or anxious, or unable to sleep at night because your mind is troubled all the time - these days? Not at all        Social Isolation    How often do you feel lonely or isolated from those around you?  Never        Alcohol Use    Q1: How often do you have a drink containing alcohol? Never     Q2: How many drinks containing alcohol do you have on a typical day when you are drinking? Patient does not drink     Q3: How often do you have six or more drinks on one occasion? Never        Utilities    In the past 12 months has the electric, gas, oil, or water company threatened to shut off services in your home? No        Health Literacy    How often do you need to have someone help you when you read instructions, pamphlets, or other written  material from your doctor or pharmacy? Sometimes                      CM spoke to pt and pt's great granddaughter @ bedside. Pt is not current with HH and uses a rollator @ home. ADDI completed 06/03/24. D/C plan is to d/c home with family. Family is interested in aftercare services such as HH or SB if recommended. CM will continue to follow potential d/c needs.

## 2024-06-03 NOTE — PT/OT/SLP PROGRESS
Occupational Therapy   Treatment    Name: Steve Castro  MRN: [id number]  Admitting Diagnosis:  Elevated troponin level       Recommendations:     Discharge Recommendations: Moderate Intensity Therapy (to low intensity, bepending on rate of recovery)  Discharge Equipment Recommendations:   (to be determined)  Barriers to discharge:       Assessment:     Steve Castro is a 89 y.o. female with a medical diagnosis of Elevated troponin level.  . Performance deficits affecting function are weakness, impaired endurance, impaired self care skills.     Rehab Prognosis:  Good; patient would benefit from acute skilled OT services to address these deficits and reach maximum level of function.       Plan:     Patient to be seen 5 x/week to address the above listed problems via self-care/home management, therapeutic activities, therapeutic exercises  Plan of Care Expires: 06/23/24  Plan of Care Reviewed with: patient    Subjective     Chief Complaint:   Patient/Family Comments/goals:   Pain/Comfort:       Objective:     Communicated with: Alondra Velasco RN prior to session.  Patient found up in chair with telemetry, PureWick, peripheral IV upon OT entry to room.    General Precautions: Standard, fall    Orthopedic Precautions:N/A  Braces: N/A  Respiratory Status: Room air     Occupational Performance:     Bed Mobility:    Sit to supine min a     Functional Mobility/Transfers:  Sit to stand min a x 2  Chair to bed min a x 2  Functional Mobility:   Activities of Daily Living:        [facility] 6 Click ADL:      Treatment & Education:  Pt performed hand helper with 3 rubber band resistances 20 reps x 2,yellow t ball 20 reps x 2, rom ex's with 1 lb wt 15 reps x 2 B elbow flex/ext,abd/add,aarom w/o wt 15 reps x 2 B shld flex graded clothes pins ex's     Patient left HOB elevated with all lines intact, call button in reach, and RN Alondra Velasco  present    GOALS:   Multidisciplinary Problems       Occupational Therapy Goals          Problem:  Occupational Therapy    Goal Priority Disciplines Outcome Interventions   Occupational Therapy Goal     OT, PT/OT Progressing    Description: STG: (in 1 week)  Pt will perform grooming with setup  Pt will bathe with setup and CGA  Pt will perform UE dressing with setup  Pt will perform LE dressing with CGA  Pt will sit EOB x 10 min with independence during a dynamic activity   Pt will transfer bed/chair/bsc with SBA with RW  Pt will perform standing task x 3 min with CGA with RW   Pt will tolerate 15 minutes of tx without fatigue      LTG: (in 5 weeks)  1.Restore to max I with self care and mobility.                        Time Tracking:     OT Date of Treatment: 06/03/24  OT Start Time: 1509  OT Stop Time: 1555  OT Total Time (min): 46 min    Billable Minutes:Therapeutic Exercise 40    OT/NIKKIE: NIKKIE          6/3/2024

## 2024-06-03 NOTE — ASSESSMENT & PLAN NOTE
Patient presented to the hospital with the complaints of frequent falls. At the time of presentation, her CK levels were elevated.  Patient apparently was lying on the floor after fall for indeterminate length of time.  Orthostatic vitals negative but BP soft.   Continue IV hydration.   Monitor CK levels

## 2024-06-03 NOTE — PROGRESS NOTES
[facility]  [facility] Medicine  Progress Note    Patient Name: Steve Castro  MRN: [id number]  Patient Class: IP- Inpatient   Admission Date: 6/1/2024  Length of Stay: 2 days  Attending Physician: Chris Sheridan MD  Primary Care Provider: Yudi, Primary Doctor        Subjective:     Principal Problem:Type 2 MI (myocardial infarction)        HPI:  Patient is a 89 yo female with no significant PMH. She presented to the [facility] ED with complaints of frequent falls. She reported that she has fallen at least twice since yesterday. She said that the first time she fell yesterday was when she was making breakfast and fell down. She said that she felt like she lost her balance and fell. She denied any dizziness, black out, diaphoresis, chest pain, SOB, palpitations, or lightheadedness before she fell. She denied hitting her head during the fall or loss of consciousness.  She reported that she has  not been eating or drinking well for last few days/weeks. Her urine output has been decreased that she contributed to decreased food intake.She has decreased appetite. She denied any chills, fever, myalgias, gastrointestinal or genitourinary symptoms.   She lives alone at her home and uses walker to move around. She is independent in ADLs. Her neighbors and Buddhist members keep check on her.  Her neighbor was at the bedside and told that she has fallen at least 6-7 times since 2024. They found her lying down on floor yesterday after fall and had to help get up from the floor.  ED course:  Vitals:  P        BP         Temp      pO2  81 91/61     98.1°F      96  %  Labs:  CBC  13.51 12.5 163    37.4         BMP  144 113 28 132   4.4 25 1.45       Troponin was 5582.6  Lactate : 3.5  EKG: NSR no ST segment changes            Overview/Hospital Course:  6/2- Echocardiogram pending. Cardiology consulted, do not recommend any intervention at this point, changed heparin to weight based Lovenox, continue aspirin.   6/3-  Difficult stick so labs drawn late, echo still pending. PT/OT follow, may require swing bed placement.     Interval History: Patient seen and examined at the bedside,  continue to be weak but improved.     Review of Systems   All other systems reviewed and are negative.    Objective:     Vital Signs (Most Recent):  Temp: 98.8 °F (37.1 °C) (06/03/24 0831)  Pulse: 80 (06/03/24 1339)  Resp: 18 (06/03/24 0831)  BP: 97/61 (06/03/24 1339)  SpO2: (!) 88 % (06/03/24 0831) Vital Signs (24h Range):  Temp:  [97.6 °F (36.4 °C)-98.8 °F (37.1 °C)] 98.8 °F (37.1 °C)  Pulse:  [73-92] 80  Resp:  [18-20] 18  SpO2:  [88 %-98 %] 88 %  BP: ()/(61-76) 97/61     Weight: 62.1 kg (137 lb)  Body mass index is 22.8 kg/m².    Intake/Output Summary (Last 24 hours) at 6/3/2024 1428  Last data filed at 6/3/2024 0435  Gross per 24 hour   Intake --   Output 1600 ml   Net -1600 ml         Physical Exam  Vitals and nursing note reviewed.   Constitutional:       Comments: Frail appearing   HENT:      Head: Normocephalic and atraumatic.      Mouth/Throat:      Mouth: Mucous membranes are moist.   Eyes:      Extraocular Movements: Extraocular movements intact.      Pupils: Pupils are equal, round, and reactive to light.   Cardiovascular:      Rate and Rhythm: Normal rate and regular rhythm.   Pulmonary:      Effort: Pulmonary effort is normal.      Breath sounds: Normal breath sounds.   Abdominal:      General: Bowel sounds are normal.      Palpations: Abdomen is soft.   Musculoskeletal:         General: Normal range of motion.      Cervical back: Normal range of motion and neck supple.   Neurological:      General: No focal deficit present.      Mental Status: She is alert and oriented to person, place, and time. Mental status is at baseline.   Psychiatric:         Mood and Affect: Mood normal.         Behavior: Behavior normal.             Significant Labs: All pertinent labs within the past 24 hours have been reviewed.    Significant Imaging: I  have reviewed all pertinent imaging results/findings within the past 24 hours.    Assessment/Plan:      * Type 2 MI (myocardial infarction)  Patient found to have Type 2 MI after presenting with:     KACY Risk Score: 75 (intermediate risk)  Wells score: 0  ECG: NSR with no acute ST segment changes  Troponin significantly elevated however trend is flat.    Troponin:   Recent Labs   Lab 06/02/24  0556   TROPONINIHS 3,123.1*     ECHO pending  Weight based with Lovenox for ACS protocol.  Consulted cardiology and appreciate recommendations, awaiting echocardiogram to discuss further.    Holding off statin due to rabdomyolysis  Monitor on tele.         Non-traumatic rhabdomyolysis  Patient presented to the hospital with the complaints of frequent falls. At the time of presentation, her CK levels were elevated.  Patient apparently was lying on the floor after fall for indeterminate length of time.  Orthostatic vitals negative but BP soft.   Continue IV hydration.   Monitor CK levels     PARK (acute kidney injury)  Patient with acute kidney injury likely due to pre-renal azotemia due to dehydration and rhabdomyolysis. PARK is currently improving with IV hydration which will be continued.  Baseline creatinine unknown - Labs reviewed- Renal function/electrolytes with Estimated Creatinine Clearance: 24.5 mL/min (A) (based on SCr of 1.32 mg/dL (H)). according to latest data.   Monitor urine output and serial BMP and adjust therapy as needed.   Avoid nephrotoxins and renally dose meds for GFR listed above.        Lactic acidosis  Patient presented with elevated serum lactic acid. It was 3.5 at presentation and reduced to 2.7 after IV hydration.    Latest lactate reviewed-  Recent Labs   Lab 06/02/24  0556   LACTATE 2.9*       Sec to dehydration.   No concern for infection.       Debility  Patient with Acute on chronic debility due to age-related physical debility. Latest AMPAC and GEMS scores have not been reviewed. Evaluation for  etiology is complete. Plan includes progressive mobility protocol initated, PT/OT consulted, and fall precautions in place.      VTE Risk Mitigation (From admission, onward)           Ordered     enoxaparin injection 60 mg  Every 24 hours         06/02/24 1159     Reason for No Pharmacological VTE Prophylaxis  Once        Comments: ACS protocol   Question:  Reasons:  Answer:  Physician Provided (leave comment)    06/01/24 2217     Place KELI hose  Until discontinued         06/01/24 2217     IP VTE HIGH RISK PATIENT  Once         06/01/24 2216     Place sequential compression device  Until discontinued         06/01/24 2216                    Discharge Planning   ARTHUR: 6/4/2024     Code Status: Full Code   Is the patient medically ready for discharge?:     Reason for patient still in hospital (select all that apply): Laboratory test and PT / OT recommendations  Discharge Plan A: Home with family                  LINDA GIMENEZ MD  Department of Hospital Medicine   [facility]

## 2024-06-03 NOTE — ASSESSMENT & PLAN NOTE
Patient with acute kidney injury likely due to pre-renal azotemia due to dehydration and rhabdomyolysis. PARK is currently improving with IV hydration which will be continued.  Baseline creatinine unknown - Labs reviewed- Renal function/electrolytes with Estimated Creatinine Clearance: 24.5 mL/min (A) (based on SCr of 1.32 mg/dL (H)). according to latest data.   Monitor urine output and serial BMP and adjust therapy as needed.   Avoid nephrotoxins and renally dose meds for GFR listed above.

## 2024-06-03 NOTE — PLAN OF CARE
Problem: Adult Inpatient Plan of Care  Goal: Plan of Care Review  Outcome: Progressing  Goal: Patient-Specific Goal (Individualized)  Outcome: Progressing  Goal: Absence of Hospital-Acquired Illness or Injury  Outcome: Progressing  Goal: Optimal Comfort and Wellbeing  Outcome: Progressing  Goal: Readiness for Transition of Care  Outcome: Progressing     Problem: Sepsis/Septic Shock  Goal: Optimal Coping  Outcome: Progressing  Goal: Absence of Bleeding  Outcome: Progressing  Goal: Blood Glucose Level Within Targeted Range  Outcome: Progressing  Goal: Absence of Infection Signs and Symptoms  Outcome: Progressing  Goal: Optimal Nutrition Intake  Outcome: Progressing     Problem: Acute Kidney Injury/Impairment  Goal: Fluid and Electrolyte Balance  Outcome: Progressing  Goal: Improved Oral Intake  Outcome: Progressing  Goal: Effective Renal Function  Outcome: Progressing     Problem: Fall Injury Risk  Goal: Absence of Fall and Fall-Related Injury  Outcome: Progressing

## 2024-06-03 NOTE — PLAN OF CARE
Problem: Adult Inpatient Plan of Care  Goal: Plan of Care Review  Outcome: Progressing  Goal: Patient-Specific Goal (Individualized)  Outcome: Progressing  Goal: Absence of Hospital-Acquired Illness or Injury  Outcome: Progressing  Goal: Optimal Comfort and Wellbeing  Outcome: Progressing  Goal: Readiness for Transition of Care  Outcome: Progressing     Problem: Sepsis/Septic Shock  Goal: Optimal Coping  Outcome: Progressing  Goal: Absence of Bleeding  Outcome: Progressing  Goal: Blood Glucose Level Within Targeted Range  Outcome: Progressing  Goal: Absence of Infection Signs and Symptoms  Outcome: Progressing  Goal: Optimal Nutrition Intake  Outcome: Progressing     Problem: Acute Kidney Injury/Impairment  Goal: Fluid and Electrolyte Balance  Outcome: Progressing  Goal: Improved Oral Intake  Outcome: Progressing  Goal: Effective Renal Function  Outcome: Progressing     Problem: Fall Injury Risk  Goal: Absence of Fall and Fall-Related Injury  Outcome: Progressing     Problem: Skin Injury Risk Increased  Goal: Skin Health and Integrity  Outcome: Progressing     Problem: Wound  Goal: Optimal Coping  Outcome: Progressing  Goal: Optimal Functional Ability  Outcome: Progressing  Goal: Absence of Infection Signs and Symptoms  Outcome: Progressing  Goal: Improved Oral Intake  Outcome: Progressing  Goal: Optimal Pain Control and Function  Outcome: Progressing  Goal: Skin Health and Integrity  Outcome: Progressing  Goal: Optimal Wound Healing  Outcome: Progressing     Problem: Infection  Goal: Absence of Infection Signs and Symptoms  Outcome: Progressing

## 2024-06-03 NOTE — SUBJECTIVE & OBJECTIVE
Interval History: Pt states she fell due to leg cramps. Her granddaughter is at bedside and states pt has had 4 falls in the last 2 months due to leg cramps or mechanical falls. Pt denies chest pain or SOB prior to falls, prior to admission or on exam today. She denies any other cardiac complaints. Physical exam is unremarkable. Pt currently lives alone with family frequently in and out checking on her. She uses a walker and a cane for ambulation.     Review of Systems   Cardiovascular:  Negative for chest pain, dyspnea on exertion, irregular heartbeat, leg swelling, near-syncope, orthopnea, palpitations and syncope.   Respiratory:  Negative for cough and shortness of breath.    Musculoskeletal:  Positive for muscle cramps.     Objective:     Vital Signs (Most Recent):  Temp: 98.8 °F (37.1 °C) (06/03/24 0831)  Pulse: 80 (06/03/24 1339)  Resp: 18 (06/03/24 0831)  BP: 97/61 (06/03/24 1339)  SpO2: (!) 88 % (06/03/24 0831) Vital Signs (24h Range):  Temp:  [97.6 °F (36.4 °C)-98.8 °F (37.1 °C)] 98.8 °F (37.1 °C)  Pulse:  [73-92] 80  Resp:  [18-20] 18  SpO2:  [88 %-98 %] 88 %  BP: ()/(61-76) 97/61     Weight: 62.1 kg (137 lb)  Body mass index is 22.8 kg/m².     SpO2: (!) 88 %         Intake/Output Summary (Last 24 hours) at 6/3/2024 1456  Last data filed at 6/3/2024 0435  Gross per 24 hour   Intake --   Output 1600 ml   Net -1600 ml       Lines/Drains/Airways       Peripheral Intravenous Line  Duration                  Peripheral IV - Single Lumen 06/01/24 20 G Anterior;Right Forearm 2 days         Peripheral IV - Single Lumen 06/01/24 2300 22 G Anterior;Left;Proximal Forearm 1 day                       Physical Exam  Vitals reviewed.   Constitutional:       General: She is not in acute distress.     Appearance: Normal appearance.   Cardiovascular:      Rate and Rhythm: Normal rate and regular rhythm.      Pulses: Normal pulses.      Heart sounds: Normal heart sounds.   Pulmonary:      Effort: Pulmonary effort is  normal.      Breath sounds: Normal breath sounds.   Musculoskeletal:      Cervical back: Neck supple. No rigidity.      Right lower leg: No edema.      Left lower leg: No edema.   Skin:     General: Skin is warm and dry.   Neurological:      Mental Status: She is alert and oriented to person, place, and time.   Psychiatric:         Mood and Affect: Mood normal.         Behavior: Behavior normal.            Significant Labs: All pertinent lab results from the last 24 hours have been reviewed. and   Recent Lab Results         06/03/24  1003        Aniso Few       Baso # 0.03       Basophil % 0.2       Crenated RBC's Few       Differential Method Scan Smear       Eos # 0.04       Eos % 0.3       Hematocrit 40.0       Hemoglobin 13.4       Immature Grans (Abs) 0.10       Immature Granulocytes 0.8       Lymph # 2.51       Lymph % 18.9       MCH 28.0       MCHC 33.5       MCV 83.5       Mono # 0.97       Mono % 7.3       MPV 11.5       Neutrophils, Abs 9.61       Neutrophils Relative 72.5       nRBC 0.2       NUCLEATED RBC ABSOLUTE 0.02       PLATELET MORPHOLOGY Decreased       Platelet Count 115       RBC 4.79       RDW 15.0       WBC 13.26               Significant Imaging: Echocardiogram: pending

## 2024-06-03 NOTE — PT/OT/SLP PROGRESS
Physical Therapy Treatment    Patient Name:  Steve Castro   MRN:  [id number]    Recommendations:     Discharge Recommendations: Moderate Intensity Therapy  Discharge Equipment Recommendations: to be determined by next level of care  Barriers to discharge: Inaccessible home and Decreased caregiver support    Assessment:     Steve Castro is a 89 y.o. female admitted with a medical diagnosis of Elevated troponin level.  She presents with the following impairments/functional limitations: weakness, impaired endurance, impaired self care skills, impaired functional mobility, gait instability, impaired balance, decreased lower extremity function, decreased upper extremity function, decreased safety awareness, pain Pt complained of cramps to left leg with attempted mobility.Unable to advance to ambulation today due to this. BP measured 92/62 supine, 97/61 sitting, but unable tolerate standing long enough for BP to read on machine. Pt will need assistance at discharge and would benefit from swingbed prior to return home.    Rehab Prognosis: Fair; patient would benefit from acute skilled PT services to address these deficits and reach maximum level of function.    Recent Surgery: * No surgery found *      Plan:     During this hospitalization, patient to be seen 5 x/week to address the identified rehab impairments via gait training, therapeutic activities, therapeutic exercises and progress toward the following goals:    Plan of Care Expires:  07/02/24    Subjective     Chief Complaint: left leg pain  Patient/Family Comments/goals: Pt is agreeable to PT   Pain/Comfort:  Pain Rating 1: 5/10  Location - Side 1: Left  Location 1: leg  Pain Addressed 1: Distraction  Pain Rating Post-Intervention 1: 4/10      Objective:     Communicated with LORAINE Velasco RN prior to session.  Patient found HOB elevated with telemetry, peripheral IV upon PT entry to room.     General Precautions: Standard, fall  Orthopedic Precautions: N/A  Braces:  N/A  Respiratory Status: Room air     Functional Mobility:  Bed Mobility:     Scooting: minimum assistance  Supine to Sit: minimum assistance  Transfers:     Sit to Stand:  minimum assistance with rolling walker  Bed to Chair: moderate assistance with  rolling walker  using  Step Transfer  Balance: fair      AM-PAC 6 CLICK MOBILITY  Turning over in bed (including adjusting bedclothes, sheets and blankets)?: 3  Sitting down on and standing up from a chair with arms (e.g., wheelchair, bedside commode, etc.): 3  Moving from lying on back to sitting on the side of the bed?: 3  Moving to and from a bed to a chair (including a wheelchair)?: 3  Need to walk in hospital room?: 2  Climbing 3-5 steps with a railing?: 1  Basic Mobility Total Score: 15       Treatment & Education:  Pt assisted to edge of bed with minimal assistance for orthostatic vitals. Pt sat at edge of bed x 10 minutes, performed sit to stand x 4, static standing x 1 minutes with HHA, pt assisted to chair with moderate assistance.  Pt performed bilateral LE: seated exercises: ankle pumps, long arc quads, marches, and hip adduction x 30 each      Patient left up in chair with all lines intact and call button in reach..    GOALS:   Multidisciplinary Problems       Physical Therapy Goals          Problem: Physical Therapy    Goal Priority Disciplines Outcome Goal Variances Interventions   Physical Therapy Goal     PT, PT/OT Progressing     Description: Short Term Goals to be met by: 2024    Patient will increase functional independence with mobility by performin. Supine to sit with independently  2. Sit to stand transfer with independently using Rolling walker  3. Bed to chair transfer with independently using Rolling walker  4. Gait  x 100 feet with independently using Rolling walker  5. Lower extremity exercise program x30 reps per handout, with assistance as needed    Long Term Goals to be met by: 2024    Pt will regain full independent  functional mobility with rollator walker to return to home situation and prior activities of daily living.                        Time Tracking:     PT Received On: 06/03/24  PT Start Time: 1334     PT Stop Time: 1409  PT Total Time (min): 35 min     Billable Minutes: Therapeutic Activity 20 and Therapeutic Exercise 12    Treatment Type: Treatment  PT/PTA: PT     Number of PTA visits since last PT visit: 0     06/03/2024

## 2024-06-03 NOTE — SUBJECTIVE & OBJECTIVE
Interval History: Patient seen and examined at the bedside,  continue to be weak but improved.     Review of Systems   All other systems reviewed and are negative.    Objective:     Vital Signs (Most Recent):  Temp: 98.8 °F (37.1 °C) (06/03/24 0831)  Pulse: 80 (06/03/24 1339)  Resp: 18 (06/03/24 0831)  BP: 97/61 (06/03/24 1339)  SpO2: (!) 88 % (06/03/24 0831) Vital Signs (24h Range):  Temp:  [97.6 °F (36.4 °C)-98.8 °F (37.1 °C)] 98.8 °F (37.1 °C)  Pulse:  [73-92] 80  Resp:  [18-20] 18  SpO2:  [88 %-98 %] 88 %  BP: ()/(61-76) 97/61     Weight: 62.1 kg (137 lb)  Body mass index is 22.8 kg/m².    Intake/Output Summary (Last 24 hours) at 6/3/2024 1428  Last data filed at 6/3/2024 0435  Gross per 24 hour   Intake --   Output 1600 ml   Net -1600 ml         Physical Exam  Vitals and nursing note reviewed.   Constitutional:       Comments: Frail appearing   HENT:      Head: Normocephalic and atraumatic.      Mouth/Throat:      Mouth: Mucous membranes are moist.   Eyes:      Extraocular Movements: Extraocular movements intact.      Pupils: Pupils are equal, round, and reactive to light.   Cardiovascular:      Rate and Rhythm: Normal rate and regular rhythm.   Pulmonary:      Effort: Pulmonary effort is normal.      Breath sounds: Normal breath sounds.   Abdominal:      General: Bowel sounds are normal.      Palpations: Abdomen is soft.   Musculoskeletal:         General: Normal range of motion.      Cervical back: Normal range of motion and neck supple.   Neurological:      General: No focal deficit present.      Mental Status: She is alert and oriented to person, place, and time. Mental status is at baseline.   Psychiatric:         Mood and Affect: Mood normal.         Behavior: Behavior normal.             Significant Labs: All pertinent labs within the past 24 hours have been reviewed.    Significant Imaging: I have reviewed all pertinent imaging results/findings within the past 24 hours.

## 2024-06-03 NOTE — ASSESSMENT & PLAN NOTE
Patient found to have Type 2 MI after presenting with:     KACY Risk Score: 75 (intermediate risk)  Wells score: 0  ECG: NSR with no acute ST segment changes  Troponin significantly elevated however trend is flat.    Troponin:   Recent Labs   Lab 06/02/24  0556   TROPONINIHS 3,123.1*     ECHO pending  Weight based with Lovenox for ACS protocol.  Consulted cardiology and appreciate recommendations, awaiting echocardiogram to discuss further.    Holding off statin due to rabdomyolysis  Monitor on tele.

## 2024-06-03 NOTE — ASSESSMENT & PLAN NOTE
Patient presented with elevated serum lactic acid. It was 3.5 at presentation and reduced to 2.7 after IV hydration.    Latest lactate reviewed-  Recent Labs   Lab 06/02/24  0556   LACTATE 2.9*       Sec to dehydration.   No concern for infection.

## 2024-06-03 NOTE — PROGRESS NOTES
H&P Update:  Kera Montes was seen and examined. History and physical has been reviewed. The patient has been examined. There have been no significant clinical changes since the completion of the originally dated History and Physical.  Patient identified by surgeon; surgical site was confirmed by patient and surgeon.     Signed By: Harjit Palm MD     April 27, 2017 7:31 AM [facility]  Cardiology  Progress Note    Patient Name: Steve Castro  MRN: [id number]  Admission Date: 6/1/2024  Hospital Length of Stay: 2 days  Code Status: Full Code   Attending Physician: Chris Sheridan MD   Primary Care Physician: Yudi, Primary Doctor  Expected Discharge Date: 6/4/2024  Principal Problem:Elevated troponin level    Subjective:     Interval History: Pt states she fell due to leg cramps. Her granddaughter is at bedside and states pt has had 4 falls in the last 2 months due to leg cramps or mechanical falls. Pt denies chest pain or SOB prior to falls, prior to admission or on exam today. She denies any other cardiac complaints. Physical exam is unremarkable. Pt currently lives alone with family frequently in and out checking on her. She uses a walker and a cane for ambulation.     Review of Systems   Cardiovascular:  Negative for chest pain, dyspnea on exertion, irregular heartbeat, leg swelling, near-syncope, orthopnea, palpitations and syncope.   Respiratory:  Negative for cough and shortness of breath.    Musculoskeletal:  Positive for muscle cramps.     Objective:     Vital Signs (Most Recent):  Temp: 98.8 °F (37.1 °C) (06/03/24 0831)  Pulse: 80 (06/03/24 1339)  Resp: 18 (06/03/24 0831)  BP: 97/61 (06/03/24 1339)  SpO2: (!) 88 % (06/03/24 0831) Vital Signs (24h Range):  Temp:  [97.6 °F (36.4 °C)-98.8 °F (37.1 °C)] 98.8 °F (37.1 °C)  Pulse:  [73-92] 80  Resp:  [18-20] 18  SpO2:  [88 %-98 %] 88 %  BP: ()/(61-76) 97/61     Weight: 62.1 kg (137 lb)  Body mass index is 22.8 kg/m².     SpO2: (!) 88 %         Intake/Output Summary (Last 24 hours) at 6/3/2024 1456  Last data filed at 6/3/2024 0435  Gross per 24 hour   Intake --   Output 1600 ml   Net -1600 ml       Lines/Drains/Airways       Peripheral Intravenous Line  Duration                  Peripheral IV - Single Lumen 06/01/24 20 G Anterior;Right Forearm 2 days         Peripheral IV - Single Lumen 06/01/24 2300 22 G  Anterior;Left;Proximal Forearm 1 day                       Physical Exam  Vitals reviewed.   Constitutional:       General: She is not in acute distress.     Appearance: Normal appearance.   Cardiovascular:      Rate and Rhythm: Normal rate and regular rhythm.      Pulses: Normal pulses.      Heart sounds: Normal heart sounds.   Pulmonary:      Effort: Pulmonary effort is normal.      Breath sounds: Normal breath sounds.   Musculoskeletal:      Cervical back: Neck supple. No rigidity.      Right lower leg: No edema.      Left lower leg: No edema.   Skin:     General: Skin is warm and dry.   Neurological:      Mental Status: She is alert and oriented to person, place, and time.   Psychiatric:         Mood and Affect: Mood normal.         Behavior: Behavior normal.            Significant Labs: All pertinent lab results from the last 24 hours have been reviewed. and   Recent Lab Results         06/03/24  1003        Aniso Few       Baso # 0.03       Basophil % 0.2       Crenated RBC's Few       Differential Method Scan Smear       Eos # 0.04       Eos % 0.3       Hematocrit 40.0       Hemoglobin 13.4       Immature Grans (Abs) 0.10       Immature Granulocytes 0.8       Lymph # 2.51       Lymph % 18.9       MCH 28.0       MCHC 33.5       MCV 83.5       Mono # 0.97       Mono % 7.3       MPV 11.5       Neutrophils, Abs 9.61       Neutrophils Relative 72.5       nRBC 0.2       NUCLEATED RBC ABSOLUTE 0.02       PLATELET MORPHOLOGY Decreased       Platelet Count 115       RBC 4.79       RDW 15.0       WBC 13.26               Significant Imaging: Echocardiogram: pending    Assessment and Plan:     Brief HPI: Patient is a 89 yo female with no significant PMH. She presented to the [facility] ED with complaints of frequent falls. She reported that she has fallen at least twice since yesterday. She said that the first time she fell yesterday was when she was making breakfast and fell down. She said that she felt like she lost her  balance and fell. She denied any dizziness, black out, diaphoresis, chest pain, SOB, palpitations, or lightheadedness before she fell. She denied hitting her head during the fall or loss of consciousness.    She reported that she has  not been eating or drinking well for last few days/weeks. Her urine output has been decreased that she contributed to decreased food intake.She has decreased appetite. She denied any chills, fever, myalgias, gastrointestinal or genitourinary symptoms.     She lives alone at her home and uses walker to move around. She is independent in ADLs. Her neighbors and [employer] members keep check on her.    Her neighbor was at the bedside and told that she has fallen at least 6-7 times since 2024. They found her lying down on floor yesterday after fall and had to help get up from the floor.    ED course:  Vitals:  P        BP         Temp      pO2  81        91/61     98.1°F      96  %  Labs:  CBC  13.51 12.5 163    37.4          BMP  144 113 28 132   4.4 25 1.45       Troponin was 5582.6  Lactate : 3.5  EKG: NSR no ST segment changes      * Elevated troponin level  - possibly secondary to demand ischemia  - Troponin trending down from 5K to 3K with ECG showing NSR with no acute ST segment changes  - CXR with no acute finding  - Echo is pending  - pt denies chest pain or SOB  - continue telemetry monitoring  - Dr. Sargent has seen and evaluated this pt  - discussed LHC with pt and pt's granddaughter to rule out critical disease as there is concern for DAPT with pt being a high fall risk although pt is still quit mobile, lives alone and performs her ADLs, they will discuss with the rest of the family and decide  - continue ASA       Non-traumatic rhabdomyolysis  - primary team managing    Lactic acidosis  - Downtrending      PARK (acute kidney injury)  - improved from 1.45 to 1.32 today        VTE Risk Mitigation (From admission, onward)           Ordered     enoxaparin injection 60 mg  Every 24 hours          06/02/24 1159     Reason for No Pharmacological VTE Prophylaxis  Once        Comments: ACS protocol   Question:  Reasons:  Answer:  Physician Provided (leave comment)    06/01/24 2217     Place KELI hose  Until discontinued         06/01/24 2217     IP VTE HIGH RISK PATIENT  Once         06/01/24 2216     Place sequential compression device  Until discontinued         06/01/24 2216                    Jessi Burgos, LEXI  Cardiology  [facility]

## 2024-06-03 NOTE — ASSESSMENT & PLAN NOTE
- possibly secondary to demand ischemia  - Troponin trending down from 5K to 3K with ECG showing NSR with no acute ST segment changes  - CXR with no acute finding  - Echo is pending  - pt denies chest pain or SOB  - continue telemetry monitoring  - Dr. Sargent has seen and evaluated this pt  - discussed LHC with pt and pt's granddaughter to rule out critical disease as there is concern for DAPT with pt being a high fall risk although pt is still quit mobile, lives alone and performs her ADLs, they will discuss with the rest of the family and decide  - continue ASA

## 2024-06-04 VITALS
TEMPERATURE: 98 F | HEIGHT: 65 IN | HEART RATE: 87 BPM | SYSTOLIC BLOOD PRESSURE: 107 MMHG | WEIGHT: 137 LBS | OXYGEN SATURATION: 97 % | DIASTOLIC BLOOD PRESSURE: 71 MMHG | BODY MASS INDEX: 22.82 KG/M2 | RESPIRATION RATE: 18 BRPM

## 2024-06-04 PROBLEM — I50.43 ACUTE ON CHRONIC COMBINED SYSTOLIC AND DIASTOLIC HEART FAILURE: Status: ACTIVE | Noted: 2024-06-04

## 2024-06-04 PROBLEM — D69.6 THROMBOCYTOPENIA: Status: ACTIVE | Noted: 2024-06-04

## 2024-06-04 PROBLEM — I50.21 ACUTE SYSTOLIC HEART FAILURE: Status: RESOLVED | Noted: 2024-06-03 | Resolved: 2024-06-04

## 2024-06-04 PROBLEM — D64.9 ANEMIA: Status: ACTIVE | Noted: 2024-06-04

## 2024-06-04 LAB
ALBUMIN SERPL BCP-MCNC: 2.3 G/DL (ref 3.5–5)
ALBUMIN/GLOB SERPL: 0.7 {RATIO}
ALP SERPL-CCNC: 78 U/L (ref 55–142)
ALT SERPL W P-5'-P-CCNC: 73 U/L (ref 13–56)
ANION GAP SERPL CALCULATED.3IONS-SCNC: 7 MMOL/L (ref 7–16)
AST SERPL W P-5'-P-CCNC: 96 U/L (ref 15–37)
BASOPHILS # BLD AUTO: 0.01 K/UL (ref 0–0.2)
BASOPHILS NFR BLD AUTO: 0.1 % (ref 0–1)
BILIRUB SERPL-MCNC: 0.5 MG/DL (ref ?–1.2)
BUN SERPL-MCNC: 25 MG/DL (ref 7–18)
BUN/CREAT SERPL: 23 (ref 6–20)
CALCIUM SERPL-MCNC: 8.1 MG/DL (ref 8.5–10.1)
CHLORIDE SERPL-SCNC: 112 MMOL/L (ref 98–107)
CK SERPL-CCNC: 605 U/L (ref 26–192)
CO2 SERPL-SCNC: 27 MMOL/L (ref 21–32)
CREAT SERPL-MCNC: 1.11 MG/DL (ref 0.55–1.02)
DIFFERENTIAL METHOD BLD: ABNORMAL
EGFR (NO RACE VARIABLE) (RUSH/TITUS): 47 ML/MIN/1.73M2
EOSINOPHIL # BLD AUTO: 0.18 K/UL (ref 0–0.5)
EOSINOPHIL NFR BLD AUTO: 2.2 % (ref 1–4)
ERYTHROCYTE [DISTWIDTH] IN BLOOD BY AUTOMATED COUNT: 14.7 % (ref 11.5–14.5)
GLOBULIN SER-MCNC: 3.2 G/DL (ref 2–4)
GLUCOSE SERPL-MCNC: 90 MG/DL (ref 74–106)
HCT VFR BLD AUTO: 30.6 % (ref 38–47)
HGB BLD-MCNC: 10.2 G/DL (ref 12–16)
IMM GRANULOCYTES # BLD AUTO: 0.03 K/UL (ref 0–0.04)
IMM GRANULOCYTES NFR BLD: 0.4 % (ref 0–0.4)
LYMPHOCYTES # BLD AUTO: 3.05 K/UL (ref 1–4.8)
LYMPHOCYTES NFR BLD AUTO: 37.4 % (ref 27–41)
MCH RBC QN AUTO: 28 PG (ref 27–31)
MCHC RBC AUTO-ENTMCNC: 33.3 G/DL (ref 32–36)
MCV RBC AUTO: 84.1 FL (ref 80–96)
MONOCYTES # BLD AUTO: 0.61 K/UL (ref 0–0.8)
MONOCYTES NFR BLD AUTO: 7.5 % (ref 2–6)
MPC BLD CALC-MCNC: 11.4 FL (ref 9.4–12.4)
NEUTROPHILS # BLD AUTO: 4.28 K/UL (ref 1.8–7.7)
NEUTROPHILS NFR BLD AUTO: 52.4 % (ref 53–65)
NRBC # BLD AUTO: 0.02 X10E3/UL
NRBC, AUTO (.00): 0.2 %
PLATELET # BLD AUTO: 130 K/UL (ref 150–400)
POTASSIUM SERPL-SCNC: 3.5 MMOL/L (ref 3.5–5.1)
PROT SERPL-MCNC: 5.5 G/DL (ref 6.4–8.2)
RBC # BLD AUTO: 3.64 M/UL (ref 4.2–5.4)
SODIUM SERPL-SCNC: 142 MMOL/L (ref 136–145)
WBC # BLD AUTO: 8.16 K/UL (ref 4.5–11)

## 2024-06-04 PROCEDURE — 97535 SELF CARE MNGMENT TRAINING: CPT | Mod: CO

## 2024-06-04 PROCEDURE — 63600175 PHARM REV CODE 636 W HCPCS: Performed by: INTERNAL MEDICINE

## 2024-06-04 PROCEDURE — 25000003 PHARM REV CODE 250

## 2024-06-04 PROCEDURE — 97110 THERAPEUTIC EXERCISES: CPT | Mod: CO

## 2024-06-04 PROCEDURE — 82550 ASSAY OF CK (CPK): CPT | Performed by: INTERNAL MEDICINE

## 2024-06-04 PROCEDURE — 97110 THERAPEUTIC EXERCISES: CPT

## 2024-06-04 PROCEDURE — 99239 HOSP IP/OBS DSCHRG MGMT >30: CPT | Mod: ,,, | Performed by: STUDENT IN AN ORGANIZED HEALTH CARE EDUCATION/TRAINING PROGRAM

## 2024-06-04 PROCEDURE — 97530 THERAPEUTIC ACTIVITIES: CPT

## 2024-06-04 PROCEDURE — 85025 COMPLETE CBC W/AUTO DIFF WBC: CPT

## 2024-06-04 PROCEDURE — 36415 COLL VENOUS BLD VENIPUNCTURE: CPT | Performed by: INTERNAL MEDICINE

## 2024-06-04 PROCEDURE — 94761 N-INVAS EAR/PLS OXIMETRY MLT: CPT

## 2024-06-04 PROCEDURE — 80053 COMPREHEN METABOLIC PANEL: CPT

## 2024-06-04 RX ORDER — NAPROXEN SODIUM 220 MG/1
81 TABLET, FILM COATED ORAL DAILY
Start: 2024-06-05 | End: 2025-06-05

## 2024-06-04 RX ORDER — POLYETHYLENE GLYCOL 3350 17 G/17G
17 POWDER, FOR SOLUTION ORAL 2 TIMES DAILY PRN
Start: 2024-06-04

## 2024-06-04 RX ADMIN — ENOXAPARIN SODIUM 60 MG: 60 INJECTION SUBCUTANEOUS at 04:06

## 2024-06-04 RX ADMIN — DICLOFENAC SODIUM TOPICAL GEL, 1% 2 G: 10 GEL TOPICAL at 09:06

## 2024-06-04 RX ADMIN — THERA TABS 1 TABLET: TAB at 09:06

## 2024-06-04 RX ADMIN — ASPIRIN 81 MG CHEWABLE TABLET 81 MG: 81 TABLET CHEWABLE at 09:06

## 2024-06-04 RX ADMIN — SODIUM CHLORIDE, POTASSIUM CHLORIDE, SODIUM LACTATE AND CALCIUM CHLORIDE: 600; 310; 30; 20 INJECTION, SOLUTION INTRAVENOUS at 02:06

## 2024-06-04 NOTE — PT/OT/SLP PROGRESS
Physical Therapy Treatment    Patient Name:  Steve Castro   MRN:  [id number]    Recommendations:     Discharge Recommendations: Moderate Intensity Therapy  Discharge Equipment Recommendations: to be determined by next level of care  Barriers to discharge: Inaccessible home and Decreased caregiver support    Assessment:     Steve Castro is a 89 y.o. female admitted with a medical diagnosis of Elevated troponin level.  She presents with the following impairments/functional limitations: weakness, impaired endurance, impaired self care skills, impaired functional mobility, gait instability, impaired balance, decreased lower extremity function, decreased upper extremity function, decreased safety awareness, pain     Pt still complaining of occasional pain in left leg but has improved. Requires assistance with bed mobility and transfer. Not safe for home. Awaiting d/[facility] to [facility].    Rehab Prognosis: Good; patient would benefit from acute skilled PT services to address these deficits and reach maximum level of function.    Recent Surgery: * No surgery found *      Plan:     During this hospitalization, patient to be seen 5 x/week to address the identified rehab impairments via gait training, therapeutic activities, therapeutic exercises and progress toward the following goals:    Plan of Care Expires:  07/02/24    Subjective     Chief Complaint: weakness  Patient/Family Comments/goals: Pt is agreeable to PT   Pain/Comfort:  Pain Rating 1: 4/10  Location - Side 1: Left  Location 1: leg  Pain Addressed 1: Distraction, Reposition  Pain Rating Post-Intervention 1: 3/10      Objective:     Communicated with WHITNEY Cordova RN prior to session.  Patient found HOB elevated with telemetry, PureWick, PICC line upon PT entry to room.     General Precautions: Standard, fall  Orthopedic Precautions: N/A  Braces: N/A  Respiratory Status: Room air     Functional Mobility:  Bed Mobility:     Scooting: minimum assistance  Supine to Sit: moderate  assistance  Transfers:     Sit to Stand:  minimum assistance with rolling walker  Bed to Chair: minimum assistance with  rolling walker  using  Step Transfer  Balance: fair      AM-PAC 6 CLICK MOBILITY  Turning over in bed (including adjusting bedclothes, sheets and blankets)?: 3  Sitting down on and standing up from a chair with arms (e.g., wheelchair, bedside commode, etc.): 3  Moving from lying on back to sitting on the side of the bed?: 3  Moving to and from a bed to a chair (including a wheelchair)?: 3  Need to walk in hospital room?: 2  Climbing 3-5 steps with a railing?: 1  Basic Mobility Total Score: 15       Treatment & Education:  Pt performed bilateral LE: bed level exercises: ankle pumps, quad sets, heel slides, and hip abduction and seated exercises: long arc quads x 30 each  Increased time allowed for mobility, transfer from bed to chair    Patient left up in chair with all lines intact and call button in reach..    GOALS:   Multidisciplinary Problems       Physical Therapy Goals          Problem: Physical Therapy    Goal Priority Disciplines Outcome Goal Variances Interventions   Physical Therapy Goal     PT, PT/OT Progressing     Description: Short Term Goals to be met by: 2024    Patient will increase functional independence with mobility by performin. Supine to sit with independently  2. Sit to stand transfer with independently using Rolling walker  3. Bed to chair transfer with independently using Rolling walker  4. Gait  x 100 feet with independently using Rolling walker  5. Lower extremity exercise program x30 reps per handout, with assistance as needed    Long Term Goals to be met by: 2024    Pt will regain full independent functional mobility with rollator walker to return to home situation and prior activities of daily living.                        Time Tracking:     PT Received On: 24  PT Start Time: 1040     PT Stop Time: 1111  PT Total Time (min): 31 min     Billable  Minutes: Therapeutic Activity 12 and Therapeutic Exercise 17    Treatment Type: Treatment  PT/PTA: PT     Number of PTA visits since last PT visit: 0     06/04/2024

## 2024-06-04 NOTE — PT/OT/SLP PROGRESS
Occupational Therapy   Treatment    Name: Steve Castro  MRN: [id number]  Admitting Diagnosis:  Elevated troponin level       Recommendations:     Discharge Recommendations: Moderate Intensity Therapy (to low intensity, bepending on rate of recovery)  Discharge Equipment Recommendations:   (to be determined)  Barriers to discharge:       Assessment:     Steve Castro is a 89 y.o. female with a medical diagnosis of Elevated troponin level. Performance deficits affecting function are weakness, impaired endurance, impaired self care skills.     Rehab Prognosis:  Good; patient would benefit from acute skilled OT services to address these deficits and reach maximum level of function.       Plan:     Patient to be seen 5 x/week to address the above listed problems via self-care/home management, therapeutic activities, therapeutic exercises  Plan of Care Expires: 06/23/24  Plan of Care Reviewed with: patient    Subjective     Chief Complaint:   Patient/Family Comments/goals:   Pain/Comfort:  Pain Rating 1: 0/10    Objective:     Communicated with: Serena Cordova RN prior to session.  Patient found up in chair with PureWick, telemetry, PICC line upon OT entry to room.    General Precautions: Standard, fall    Orthopedic Precautions:N/A  Braces: N/A  Respiratory Status: Room air     Occupational Performance:     Bed Mobility:         Functional Mobility/Transfers:    Functional Mobility:     Activities of Daily Living:   Pt c/o having difficulty feeding herself secondary to difficulty with gripping utensil therefore therapist adapted her utensil with foam and pt was  able to feed herself w/o difficulty.    [facility] 6 Click ADL:      Treatment & Education:  Pt performed hand helper with 3 rubber band resistances 20 reps x 2, yellow t ball ex's 20 reps x 2, aarom with 1 lb wt 15 reps x 2 B elbow flex/ext,abd/add,10 reps x 2 B shld flex, peg bd fine motor ex    Patient left up in chair with all lines intact and call button in  reach    GOALS:   Multidisciplinary Problems       Occupational Therapy Goals          Problem: Occupational Therapy    Goal Priority Disciplines Outcome Interventions   Occupational Therapy Goal     OT, PT/OT Progressing    Description: STG: (in 1 week)  Pt will perform grooming with setup  Pt will bathe with setup and CGA  Pt will perform UE dressing with setup  Pt will perform LE dressing with CGA  Pt will sit EOB x 10 min with independence during a dynamic activity   Pt will transfer bed/chair/bsc with SBA with RW  Pt will perform standing task x 3 min with CGA with RW   Pt will tolerate 15 minutes of tx without fatigue      LTG: (in 5 weeks)  1.Restore to max I with self care and mobility.                        Time Tracking:     OT Date of Treatment: 06/04/24  OT Start Time: 1217 (1430)  OT Stop Time: 1229 (1505)  OT Total Time (min): 12 min, 35 min    Billable Minutes:Self Care/Home Management 10  Therapeutic Exercise 30    OT/NIKKIE: NIKKIE          6/4/2024

## 2024-06-04 NOTE — ASSESSMENT & PLAN NOTE
Patient with acute kidney injury likely due to pre-renal azotemia due to dehydration and rhabdomyolysis. PARK is currently improving with IV hydration which will be continued.  Baseline creatinine unknown - Labs reviewed- Renal function/electrolytes with Estimated Creatinine Clearance: 29.1 mL/min (A) (based on SCr of 1.11 mg/dL (H)). according to latest data.   Monitor urine output and serial BMP and adjust therapy as needed.   Avoid nephrotoxins and renally dose meds for GFR listed above.     resolve

## 2024-06-04 NOTE — ASSESSMENT & PLAN NOTE
Patient with Acute on chronic debility due to age-related physical debility. Latest AMPAC and GEMS scores have not been reviewed. Evaluation for etiology is complete. Plan includes progressive mobility protocol initated, PT/OT consulted, and fall precautions in place.  Plan for discharge to swing bed

## 2024-06-04 NOTE — DISCHARGE SUMMARY
[facility]  [facility] Medicine  Discharge Summary      Patient Name: Steve Castro  MRN: [id number]  TAISHA: [id number]  Patient Class: IP- Inpatient  Admission Date: 6/1/2024  Hospital Length of Stay: 3 days  Discharge Date and Time:  06/04/2024 1:42 PM  Attending Physician: Nirmal Guzman DO   Discharging Provider: Nirmal Guzman DO  Primary Care Provider: Yudi, Primary Doctor    Primary Care Team: Networked reference to record PCT     HPI:   Patient is a 89 yo female with no significant PMH. She presented to the [facility] ED with complaints of frequent falls. She reported that she has fallen at least twice since yesterday. She said that the first time she fell yesterday was when she was making breakfast and fell down. She said that she felt like she lost her balance and fell. She denied any dizziness, black out, diaphoresis, chest pain, SOB, palpitations, or lightheadedness before she fell. She denied hitting her head during the fall or loss of consciousness.  She reported that she has  not been eating or drinking well for last few days/weeks. Her urine output has been decreased that she contributed to decreased food intake.She has decreased appetite. She denied any chills, fever, myalgias, gastrointestinal or genitourinary symptoms.   She lives alone at her home and uses walker to move around. She is independent in ADLs. Her neighbors and Scientologist members keep check on her.  Her neighbor was at the bedside and told that she has fallen at least 6-7 times since 2024. They found her lying down on floor yesterday after fall and had to help get up from the floor.  ED course:  Vitals:  P        BP         Temp      pO2  81 91/61     98.1°F      96  %  Labs:  CBC  13.51 12.5 163    37.4         BMP  144 113 28 132   4.4 25 1.45       Troponin was 5582.6  Lactate : 3.5  EKG: NSR no ST segment changes            * No surgery found *      Hospital Course:   6/2- Echocardiogram pending. Cardiology consulted, do  not recommend any intervention at this point, changed heparin to weight based Lovenox, continue aspirin.   6/3- Difficult stick so labs drawn late, echo still pending. PT/OT follow, may require swing bed placement.     6/4 patient doing well today.  She declines left heart catheterization.  Her blood pressure will not tolerate guideline directed medical therapy for her heart failure.  We will have her follow up with Cardiology in 1-2 weeks.  She is agreeable to swing bed.  Plan for discharge to swing bed.  Follow up with PCP in 1 week.     Goals of Care Treatment Preferences:  Code Status: Full Code      Consults:   Consults (From admission, onward)          Status Ordering Provider     Inpatient consult to Social Work  Once        Provider:  (Not yet assigned)    Completed RERE ANDRADE     Inpatient consult to Social Work  Once        Provider:  (Not yet assigned)    Completed RERE ANDRADE     Inpatient consult to Cardiology  Once        Provider:  Royer Yu MD    Completed ANAND YEUNG            Cardiac/Vascular  * Elevated troponin level  Patient found to have Type 2 MI after presenting with:     KACY Risk Score: 75 (intermediate risk)  Wells score: 0  ECG: NSR with no acute ST segment changes  Troponin significantly elevated however trend is flat.    Troponin:   Recent Labs   Lab 06/02/24  0556   TROPONINIHS 3,123.1*     ECHO pending  Weight based with Lovenox for ACS protocol.  Consulted cardiology and appreciate recommendations, awaiting echocardiogram to discuss further.    Holding off statin due to rabdomyolysis  Monitor on tele.    Likely due to rhabdo.  Declining left heart catheterization.  Follow up Cardiology.      Acute on chronic combined systolic and diastolic heart failure  Patient is identified as having Systolic (HFrEF) heart failure that is Chronic. CHF is currently controlled. Latest ECHO performed and demonstrates- Results for orders placed during the hospital encounter of  "06/01/24    Echo    Interpretation Summary    Left Ventricle: The left ventricle is normal in size. Normal wall thickness. Regional wall motion abnormalities present. There is severely reduced systolic function with a visually estimated ejection fraction of 20 - 25%. Biplane (2D) method of discs ejection fraction is 23%. There is diastolic dysfunction.    Right Ventricle: Mild right ventricular enlargement. Systolic function is moderately reduced.    Right Atrium: Right atrium is mildly dilated.    Aortic Valve: The aortic valve is a trileaflet valve. Mildly calcified cusps. There is moderate to severe aortic regurgitation with an eccentrically directed jet.    Tricuspid Valve: There is mild to moderate regurgitation.    Pulmonary Artery: The estimated pulmonary artery systolic pressure is 33 mmHg.    IVC/SVC: Elevated venous pressure at 15 mmHg.  . Blood pressure unable to tolerate guideline directed medical therapy . monitor clinical status closely. Monitor on telemetry. Patient is off CHF pathway.  Monitor strict Is&Os and daily weights.  Place on fluid restriction of 2 L. Cardiology has been consulted. Continue to stress to patient importance of self efficacy and  on diet for CHF. Last BNP reviewed- and noted below No results for input(s): "BNP", "BNPTRIAGEBLO" in the last 168 hours.      Declines left heart catheterization.  Cardiology follow up      Aortic valve regurgitation   Cardiology follow up      Acute systolic heart failure-resolved as of 6/4/2024        Renal/  Lactic acidosis  Patient presented with elevated serum lactic acid. It was 3.5 at presentation and reduced to 2.7 after IV hydration.    Latest lactate reviewed-  Recent Labs   Lab 06/02/24  0556   LACTATE 2.9*       Sec to dehydration.   No concern for infection.     PCP follow up    PARK (acute kidney injury)  Patient with acute kidney injury likely due to pre-renal azotemia due to dehydration and rhabdomyolysis. PARK is currently " improving with IV hydration which will be continued.  Baseline creatinine unknown - Labs reviewed- Renal function/electrolytes with Estimated Creatinine Clearance: 29.1 mL/min (A) (based on SCr of 1.11 mg/dL (H)). according to latest data.   Monitor urine output and serial BMP and adjust therapy as needed.   Avoid nephrotoxins and renally dose meds for GFR listed above.     resolve    Hematology  Thrombocytopenia  Patient was found to have thrombocytopenia, the likely etiology is secondary to medications- unclear etiology at this time , will monitor the platelets Daily. Will transfuse if platelet count is <10k. Hold DVT prophylaxis if platelets are <50k. The patient's platelet results have been reviewed and are listed below.  Recent Labs   Lab 06/04/24  0429   *    Stop heparin products.  PCP follow up      Oncology  Anemia  Patient's anemia is currently controlled. Has not received any PRBCs to date. Etiology likely d/t  iron-deficiency  Current CBC reviewed-   Lab Results   Component Value Date    HGB 10.2 (L) 06/04/2024    HCT 30.6 (L) 06/04/2024     Monitor serial CBC and transfuse if patient becomes hemodynamically unstable, symptomatic or H/H drops below 7/21.  PCP follow up    Orthopedic  Non-traumatic rhabdomyolysis  Patient presented to the hospital with the complaints of frequent falls. At the time of presentation, her CK levels were elevated.  Patient apparently was lying on the floor after fall for indeterminate length of time.  Orthostatic vitals negative but BP soft.   Continue IV hydration.   Monitor CK levels   Renal function and CK levels down trending.  Stable for discharge.  Follow up PCP    Other  Debility  Patient with Acute on chronic debility due to age-related physical debility. Latest AMPAC and GEMS scores have not been reviewed. Evaluation for etiology is complete. Plan includes progressive mobility protocol initated, PT/OT consulted, and fall precautions in place.  Plan for  discharge to swing bed      Final Active Diagnoses:    Diagnosis Date Noted POA    PRINCIPAL PROBLEM:  Elevated troponin level [R79.89] 06/01/2024 Yes    Acute on chronic combined systolic and diastolic heart failure [I50.43] 06/04/2024 Yes    Anemia [D64.9] 06/04/2024 Yes    Thrombocytopenia [D69.6] 06/04/2024 Yes    Aortic valve regurgitation [I35.1] 06/03/2024 Yes    Non-traumatic rhabdomyolysis [M62.82] 06/02/2024 Yes    PARK (acute kidney injury) [N17.9] 06/01/2024 Yes    Lactic acidosis [E87.20] 06/01/2024 Yes    Debility [R53.81] 06/01/2024 Yes      Problems Resolved During this Admission:    Diagnosis Date Noted Date Resolved POA    Acute systolic heart failure [I50.21] 06/03/2024 06/04/2024 Yes       Discharged Condition: good    Disposition: Another Health Care Inst*    Follow Up:   Follow-up Information       Aide Beltran MD. Schedule an appointment as soon as possible for a visit in 1 week(s).    Specialty: Family Medicine  Contact information:  [address] [facility] [id number] [phone]               Allen Sargent MD. Schedule an appointment as soon as possible for a visit in 2 week(s).    Specialty: Cardiology  Contact information:  [address] [facility] [address]  [phone]                           Patient Instructions:      Diet Cardiac     Activity as tolerated       Significant Diagnostic Studies: Labs: All labs within the past 24 hours have been reviewed    Pending Diagnostic Studies:       None           Medications:  Reconciled Home Medications:      Medication List        START taking these medications      aspirin 81 MG Chew  Take 1 tablet (81 mg total) by mouth once daily.  Start taking on: June 5, 2024     polyethylene glycol 17 gram Pwpk  Commonly known as: GLYCOLAX  Take 17 g by mouth 2 (two) times daily as needed for Constipation.            CONTINUE taking these medications      multivitamin per tablet  Commonly known as: THERAGRAN  Take 1 tablet by mouth once  daily.              Indwelling Lines/Drains at time of discharge:   Lines/Drains/Airways       Drain  Duration             Female External Urinary Catheter w/ Suction 06/03/24 1215 1 day                    Time spent on the discharge of patient: >30 minutes         Nirmal Guzman DO  Department of Hospital Medicine  [facility]

## 2024-06-04 NOTE — PLAN OF CARE
Problem: Adult Inpatient Plan of Care  Goal: Plan of Care Review  Outcome: Adequate for Care Transition  Goal: Patient-Specific Goal (Individualized)  Outcome: Adequate for Care Transition  Goal: Absence of Hospital-Acquired Illness or Injury  Outcome: Adequate for Care Transition  Goal: Optimal Comfort and Wellbeing  Outcome: Adequate for Care Transition  Goal: Readiness for Transition of Care  Outcome: Adequate for Care Transition     Problem: Sepsis/Septic Shock  Goal: Optimal Coping  Outcome: Adequate for Care Transition  Goal: Absence of Bleeding  Outcome: Adequate for Care Transition  Goal: Blood Glucose Level Within Targeted Range  Outcome: Adequate for Care Transition  Goal: Absence of Infection Signs and Symptoms  Outcome: Adequate for Care Transition  Goal: Optimal Nutrition Intake  Outcome: Adequate for Care Transition     Problem: Acute Kidney Injury/Impairment  Goal: Fluid and Electrolyte Balance  Outcome: Adequate for Care Transition  Goal: Improved Oral Intake  Outcome: Adequate for Care Transition  Goal: Effective Renal Function  Outcome: Adequate for Care Transition     Problem: Fall Injury Risk  Goal: Absence of Fall and Fall-Related Injury  Outcome: Adequate for Care Transition     Problem: Skin Injury Risk Increased  Goal: Skin Health and Integrity  Outcome: Adequate for Care Transition     Problem: Wound  Goal: Optimal Coping  Outcome: Adequate for Care Transition  Goal: Optimal Functional Ability  Outcome: Adequate for Care Transition  Goal: Absence of Infection Signs and Symptoms  Outcome: Adequate for Care Transition  Goal: Improved Oral Intake  Outcome: Adequate for Care Transition  Goal: Optimal Pain Control and Function  Outcome: Adequate for Care Transition  Goal: Skin Health and Integrity  Outcome: Adequate for Care Transition  Goal: Optimal Wound Healing  Outcome: Adequate for Care Transition     Problem: Infection  Goal: Absence of Infection Signs and Symptoms  Outcome: Adequate for  Care Transition

## 2024-06-04 NOTE — ASSESSMENT & PLAN NOTE
Patient presented with elevated serum lactic acid. It was 3.5 at presentation and reduced to 2.7 after IV hydration.    Latest lactate reviewed-  Recent Labs   Lab 06/02/24  0556   LACTATE 2.9*       Sec to dehydration.   No concern for infection.     PCP follow up

## 2024-06-04 NOTE — SUBJECTIVE & OBJECTIVE
Interval History:  No significant events overnight.  Family inpatient together upon exam.  Plan of care discussed in detail.  At this time the patient and family both wish for conservative treatment and did not desire invasive measurements.    Review of Systems   Cardiovascular:  Negative for chest pain, dyspnea on exertion, irregular heartbeat, leg swelling, near-syncope, orthopnea, palpitations and syncope.   Respiratory:  Negative for cough and shortness of breath.    Musculoskeletal:  Positive for muscle cramps.   All other systems reviewed and are negative.    Objective:     Vital Signs (Most Recent):  Temp: 98.1 °F (36.7 °C) (06/04/24 1132)  Pulse: 81 (06/04/24 1132)  Resp: 18 (06/04/24 1132)  BP: 96/65 (06/04/24 1132)  SpO2: 97 % (06/04/24 1132) Vital Signs (24h Range):  Temp:  [98.1 °F (36.7 °C)-98.3 °F (36.8 °C)] 98.1 °F (36.7 °C)  Pulse:  [77-84] 81  Resp:  [16-18] 18  SpO2:  [95 %-97 %] 97 %  BP: ()/(61-72) 96/65     Weight: 62.1 kg (137 lb)  Body mass index is 22.8 kg/m².     SpO2: 97 %         Intake/Output Summary (Last 24 hours) at 6/4/2024 1540  Last data filed at 6/4/2024 0440  Gross per 24 hour   Intake 2503.17 ml   Output 1600 ml   Net 903.17 ml       Lines/Drains/Airways       Drain  Duration             Female External Urinary Catheter w/ Suction 06/03/24 1215 1 day              Peripheral Intravenous Line  Duration                  Midline Catheter - Single Lumen 06/03/24 1745 Left basilic vein (medial side of arm) 20g x 10cm <1 day                       Physical Exam  Vitals reviewed.   Constitutional:       General: She is not in acute distress.     Appearance: Normal appearance.   Cardiovascular:      Rate and Rhythm: Normal rate and regular rhythm.      Pulses: Normal pulses.      Heart sounds: Normal heart sounds.   Pulmonary:      Effort: Pulmonary effort is normal.      Breath sounds: Normal breath sounds.   Musculoskeletal:      Cervical back: Neck supple. No rigidity.      Right  lower leg: No edema.      Left lower leg: No edema.   Skin:     General: Skin is warm and dry.   Neurological:      Mental Status: She is alert and oriented to person, place, and time.   Psychiatric:         Mood and Affect: Mood normal.         Behavior: Behavior normal.            Significant Labs: All pertinent lab results from the last 24 hours have been reviewed.

## 2024-06-04 NOTE — ASSESSMENT & PLAN NOTE
Patient presented to the hospital with the complaints of frequent falls. At the time of presentation, her CK levels were elevated.  Patient apparently was lying on the floor after fall for indeterminate length of time.  Orthostatic vitals negative but BP soft.   Continue IV hydration.   Monitor CK levels   Renal function and CK levels down trending.  Stable for discharge.  Follow up PCP

## 2024-06-04 NOTE — ASSESSMENT & PLAN NOTE
- possibly secondary to demand ischemia  - Troponin trending down from 5K to 3K with ECG showing NSR with no acute ST segment changes  - CXR with no acute finding  - Echo is pending  - pt denies chest pain or SOB  - continue telemetry monitoring  - Dr. Sargent has seen and evaluated this pt  - discussed LHC with pt and pt's granddaughter to rule out critical disease as there is concern for DAPT with pt being a high fall risk although pt is still quit mobile, lives alone and performs her ADLs, they will discuss with the rest of the family and decide  - continue ASA     6/4:Family inpatient together upon exam.  Plan of care discussed in detail.  At this time the patient and family both wish for conservative treatment and did not desire invasive measurements.  Cardiology will sign off this time please call with questions or concerns.  The patient can follow up with Cardiology as scheduled

## 2024-06-04 NOTE — PROGRESS NOTES
[facility]  Cardiology  Progress Note    Patient Name: Steve Castro  MRN: [id number]  Admission Date: 6/1/2024  Hospital Length of Stay: 3 days  Code Status: Full Code   Attending Physician: Nirmal Guzman DO   Primary Care Physician: Yudi, Primary Doctor  Expected Discharge Date: 6/4/2024  Principal Problem:Elevated troponin level    Subjective:     Hospital Course:   No notes on file    Interval History:  No significant events overnight.  Family inpatient together upon exam.  Plan of care discussed in detail.  At this time the patient and family both wish for conservative treatment and did not desire invasive measurements.    Review of Systems   Cardiovascular:  Negative for chest pain, dyspnea on exertion, irregular heartbeat, leg swelling, near-syncope, orthopnea, palpitations and syncope.   Respiratory:  Negative for cough and shortness of breath.    Musculoskeletal:  Positive for muscle cramps.   All other systems reviewed and are negative.    Objective:     Vital Signs (Most Recent):  Temp: 98.1 °F (36.7 °C) (06/04/24 1132)  Pulse: 81 (06/04/24 1132)  Resp: 18 (06/04/24 1132)  BP: 96/65 (06/04/24 1132)  SpO2: 97 % (06/04/24 1132) Vital Signs (24h Range):  Temp:  [98.1 °F (36.7 °C)-98.3 °F (36.8 °C)] 98.1 °F (36.7 °C)  Pulse:  [77-84] 81  Resp:  [16-18] 18  SpO2:  [95 %-97 %] 97 %  BP: ()/(61-72) 96/65     Weight: 62.1 kg (137 lb)  Body mass index is 22.8 kg/m².     SpO2: 97 %         Intake/Output Summary (Last 24 hours) at 6/4/2024 1540  Last data filed at 6/4/2024 0440  Gross per 24 hour   Intake 2503.17 ml   Output 1600 ml   Net 903.17 ml       Lines/Drains/Airways       Drain  Duration             Female External Urinary Catheter w/ Suction 06/03/24 1215 1 day              Peripheral Intravenous Line  Duration                  Midline Catheter - Single Lumen 06/03/24 1745 Left basilic vein (medial side of arm) 20g x 10cm <1 day                       Physical Exam  Vitals  reviewed.   Constitutional:       General: She is not in acute distress.     Appearance: Normal appearance.   Cardiovascular:      Rate and Rhythm: Normal rate and regular rhythm.      Pulses: Normal pulses.      Heart sounds: Normal heart sounds.   Pulmonary:      Effort: Pulmonary effort is normal.      Breath sounds: Normal breath sounds.   Musculoskeletal:      Cervical back: Neck supple. No rigidity.      Right lower leg: No edema.      Left lower leg: No edema.   Skin:     General: Skin is warm and dry.   Neurological:      Mental Status: She is alert and oriented to person, place, and time.   Psychiatric:         Mood and Affect: Mood normal.         Behavior: Behavior normal.            Significant Labs: All pertinent lab results from the last 24 hours have been reviewed.    Assessment and Plan:         * Elevated troponin level  - possibly secondary to demand ischemia  - Troponin trending down from 5K to 3K with ECG showing NSR with no acute ST segment changes  - CXR with no acute finding  - Echo is pending  - pt denies chest pain or SOB  - continue telemetry monitoring  - Dr. Sargent has seen and evaluated this pt  - discussed LHC with pt and pt's granddaughter to rule out critical disease as there is concern for DAPT with pt being a high fall risk although pt is still quit mobile, lives alone and performs her ADLs, they will discuss with the rest of the family and decide  - continue ASA     6/4:Family inpatient together upon exam.  Plan of care discussed in detail.  At this time the patient and family both wish for conservative treatment and did not desire invasive measurements.  Cardiology will sign off this time please call with questions or concerns.  The patient can follow up with Cardiology as scheduled        Non-traumatic rhabdomyolysis  - primary team managing    Lactic acidosis  - Downtrending      PAKR (acute kidney injury)  - improved from 1.45 to 1.32 today        VTE Risk Mitigation (From  admission, onward)           Ordered     enoxaparin injection 60 mg  Every 24 hours         06/02/24 1159     Reason for No Pharmacological VTE Prophylaxis  Once        Comments: ACS protocol   Question:  Reasons:  Answer:  Physician Provided (leave comment)    06/01/24 2217     Place KELI hose  Until discontinued         06/01/24 2217     IP VTE HIGH RISK PATIENT  Once         06/01/24 2216     Place sequential compression device  Until discontinued         06/01/24 2216                    JERI Shepard  Cardiology  [facility]

## 2024-06-04 NOTE — ASSESSMENT & PLAN NOTE
Patient found to have Type 2 MI after presenting with:     KACY Risk Score: 75 (intermediate risk)  Wells score: 0  ECG: NSR with no acute ST segment changes  Troponin significantly elevated however trend is flat.    Troponin:   Recent Labs   Lab 06/02/24  0556   TROPONINIHS 3,123.1*     ECHO pending  Weight based with Lovenox for ACS protocol.  Consulted cardiology and appreciate recommendations, awaiting echocardiogram to discuss further.    Holding off statin due to rabdomyolysis  Monitor on tele.    Likely due to rhabdo.  Declining left heart catheterization.  Follow up Cardiology.

## 2024-06-04 NOTE — ASSESSMENT & PLAN NOTE
Patient's anemia is currently controlled. Has not received any PRBCs to date. Etiology likely d/t  iron-deficiency  Current CBC reviewed-   Lab Results   Component Value Date    HGB 10.2 (L) 06/04/2024    HCT 30.6 (L) 06/04/2024     Monitor serial CBC and transfuse if patient becomes hemodynamically unstable, symptomatic or H/H drops below 7/21.  PCP follow up

## 2024-06-04 NOTE — ASSESSMENT & PLAN NOTE
"Patient is identified as having Systolic (HFrEF) heart failure that is Chronic. CHF is currently controlled. Latest ECHO performed and demonstrates- Results for orders placed during the hospital encounter of 06/01/24    Echo    Interpretation Summary    Left Ventricle: The left ventricle is normal in size. Normal wall thickness. Regional wall motion abnormalities present. There is severely reduced systolic function with a visually estimated ejection fraction of 20 - 25%. Biplane (2D) method of discs ejection fraction is 23%. There is diastolic dysfunction.    Right Ventricle: Mild right ventricular enlargement. Systolic function is moderately reduced.    Right Atrium: Right atrium is mildly dilated.    Aortic Valve: The aortic valve is a trileaflet valve. Mildly calcified cusps. There is moderate to severe aortic regurgitation with an eccentrically directed jet.    Tricuspid Valve: There is mild to moderate regurgitation.    Pulmonary Artery: The estimated pulmonary artery systolic pressure is 33 mmHg.    IVC/SVC: Elevated venous pressure at 15 mmHg.  . Blood pressure unable to tolerate guideline directed medical therapy . monitor clinical status closely. Monitor on telemetry. Patient is off CHF pathway.  Monitor strict Is&Os and daily weights.  Place on fluid restriction of 2 L. Cardiology has been consulted. Continue to stress to patient importance of self efficacy and  on diet for CHF. Last BNP reviewed- and noted below No results for input(s): "BNP", "BNPTRIAGEBLO" in the last 168 hours.      Declines left heart catheterization.  Cardiology follow up    "

## 2024-06-04 NOTE — ASSESSMENT & PLAN NOTE
Patient was found to have thrombocytopenia, the likely etiology is secondary to medications- unclear etiology at this time , will monitor the platelets Daily. Will transfuse if platelet count is <10k. Hold DVT prophylaxis if platelets are <50k. The patient's platelet results have been reviewed and are listed below.  Recent Labs   Lab 06/04/24  0429   *    Stop heparin products.  PCP follow up

## 2024-06-05 NOTE — PLAN OF CARE
[facility]  Discharge Final Note    Primary Care Provider: No, Primary Doctor    Expected Discharge Date: 6/4/2024    Final Discharge Note (most recent)       Final Note - 06/05/24 1432          Final Note    Assessment Type Final Discharge Note     Anticipated Discharge Disposition Rehab Facility        Post-Acute Status    Post-Acute Authorization Placement     Post-Acute Placement Status Set-up Complete/Auth obtained     Patient choice form signed by patient/caregiver List from CMS Compare     Discharge Delays None known at this time                     Important Message from Medicare  Important Message from Medicare regarding Discharge Appeal Rights: Signed/date by patient/caregiver     Date IMM was signed: 06/04/24  Time IMM was signed: 1444    Contact Info       Aide Beltran MD   Specialty: Family Medicine    [address] [facility] [address]   Phone: [phone]       Next Steps: Schedule an appointment as soon as possible for a visit in 1 week(s)    Instructions: Please follow up on June 12 at 9:00    Allen Sargent MD   Specialty: Cardiology    [address] [facility] [address]   Phone: [phone]       Next Steps: Schedule an appointment as soon as possible for a visit in 2 week(s)    Instructions: Please follow up on June 18 at 10:30          Pt d/c to [facility]

## 2024-06-06 NOTE — PHYSICIAN QUERY
"Please clarify the conflicting documentation.      To respond, click "New Note" select your response press enter then sign to complete the query       Chronic Systolic Heart Failure (HFrEF or HFmrEF)          "

## 2024-06-07 LAB — BACTERIA BLD CULT: NORMAL

## 2024-06-08 LAB — BACTERIA BLD CULT: NORMAL

## 2024-09-02 PROBLEM — N17.9 AKI (ACUTE KIDNEY INJURY): Status: RESOLVED | Noted: 2024-06-01 | Resolved: 2024-09-02

## 2025-01-30 ENCOUNTER — OFFICE VISIT (OUTPATIENT)
Dept: FAMILY MEDICINE | Facility: CLINIC | Age: OVER 89
End: 2025-01-30
Payer: MEDICARE

## 2025-01-30 VITALS
RESPIRATION RATE: 18 BRPM | SYSTOLIC BLOOD PRESSURE: 113 MMHG | TEMPERATURE: 99 F | HEIGHT: 65 IN | BODY MASS INDEX: 22.82 KG/M2 | HEART RATE: 91 BPM | WEIGHT: 137 LBS | OXYGEN SATURATION: 99 % | DIASTOLIC BLOOD PRESSURE: 67 MMHG

## 2025-01-30 DIAGNOSIS — R53.81 DEBILITY: ICD-10-CM

## 2025-01-30 DIAGNOSIS — M19.90 ARTHRITIS: ICD-10-CM

## 2025-01-30 DIAGNOSIS — R29.6 FREQUENT FALLS: Primary | ICD-10-CM

## 2025-01-30 DIAGNOSIS — R25.2 CRAMPING OF HANDS: ICD-10-CM

## 2025-01-30 DIAGNOSIS — M19.90 OSTEOARTHRITIS, UNSPECIFIED OSTEOARTHRITIS TYPE, UNSPECIFIED SITE: ICD-10-CM

## 2025-01-30 DIAGNOSIS — D64.9 ANEMIA, UNSPECIFIED TYPE: ICD-10-CM

## 2025-01-30 LAB
ALBUMIN SERPL BCP-MCNC: 3.4 G/DL (ref 3.4–4.8)
ALBUMIN/GLOB SERPL: 0.9 {RATIO}
ALP SERPL-CCNC: 131 U/L (ref 40–150)
ALT SERPL W P-5'-P-CCNC: 26 U/L
ANION GAP SERPL CALCULATED.3IONS-SCNC: 13 MMOL/L (ref 7–16)
AST SERPL W P-5'-P-CCNC: 50 U/L (ref 5–34)
BASOPHILS # BLD AUTO: 0.03 K/UL (ref 0–0.2)
BASOPHILS NFR BLD AUTO: 0.5 % (ref 0–1)
BILIRUB SERPL-MCNC: 0.3 MG/DL
BUN SERPL-MCNC: 11 MG/DL (ref 10–20)
BUN/CREAT SERPL: 11 (ref 6–20)
CALCIUM SERPL-MCNC: 9.6 MG/DL (ref 8.4–10.2)
CHLORIDE SERPL-SCNC: 109 MMOL/L (ref 98–111)
CO2 SERPL-SCNC: 25 MMOL/L (ref 23–31)
CREAT SERPL-MCNC: 1.03 MG/DL (ref 0.55–1.02)
DIFFERENTIAL METHOD BLD: ABNORMAL
EGFR (NO RACE VARIABLE) (RUSH/TITUS): 51 ML/MIN/1.73M2
EOSINOPHIL # BLD AUTO: 0.28 K/UL (ref 0–0.5)
EOSINOPHIL NFR BLD AUTO: 4.9 % (ref 1–4)
ERYTHROCYTE [DISTWIDTH] IN BLOOD BY AUTOMATED COUNT: 13.9 % (ref 11.5–14.5)
GLOBULIN SER-MCNC: 3.7 G/DL (ref 2–4)
GLUCOSE SERPL-MCNC: 83 MG/DL (ref 75–121)
HCT VFR BLD AUTO: 36.8 % (ref 38–47)
HGB BLD-MCNC: 12.1 G/DL (ref 12–16)
IMM GRANULOCYTES # BLD AUTO: 0.01 K/UL (ref 0–0.04)
IMM GRANULOCYTES NFR BLD: 0.2 % (ref 0–0.4)
LYMPHOCYTES # BLD AUTO: 2.1 K/UL (ref 1–4.8)
LYMPHOCYTES NFR BLD AUTO: 36.6 % (ref 27–41)
MAGNESIUM SERPL-MCNC: 2.4 MG/DL (ref 1.6–2.6)
MCH RBC QN AUTO: 27.7 PG (ref 27–31)
MCHC RBC AUTO-ENTMCNC: 32.9 G/DL (ref 32–36)
MCV RBC AUTO: 84.2 FL (ref 80–96)
MONOCYTES # BLD AUTO: 0.62 K/UL (ref 0–0.8)
MONOCYTES NFR BLD AUTO: 10.8 % (ref 2–6)
MPC BLD CALC-MCNC: 9.9 FL (ref 9.4–12.4)
NEUTROPHILS # BLD AUTO: 2.7 K/UL (ref 1.8–7.7)
NEUTROPHILS NFR BLD AUTO: 47 % (ref 53–65)
NRBC # BLD AUTO: 0 X10E3/UL
NRBC, AUTO (.00): 0 %
PLATELET # BLD AUTO: 278 K/UL (ref 150–400)
POTASSIUM SERPL-SCNC: 4.1 MMOL/L (ref 3.5–5.1)
PROT SERPL-MCNC: 7.1 G/DL (ref 5.8–7.6)
RBC # BLD AUTO: 4.37 M/UL (ref 4.2–5.4)
SODIUM SERPL-SCNC: 143 MMOL/L (ref 136–145)
WBC # BLD AUTO: 5.74 K/UL (ref 4.5–11)

## 2025-01-30 PROCEDURE — 83735 ASSAY OF MAGNESIUM: CPT | Mod: ,,, | Performed by: CLINICAL MEDICAL LABORATORY

## 2025-01-30 PROCEDURE — 85025 COMPLETE CBC W/AUTO DIFF WBC: CPT | Mod: ,,, | Performed by: CLINICAL MEDICAL LABORATORY

## 2025-01-30 PROCEDURE — 80053 COMPREHEN METABOLIC PANEL: CPT | Mod: ,,, | Performed by: CLINICAL MEDICAL LABORATORY

## 2025-01-30 RX ORDER — DICLOFENAC SODIUM 10 MG/G
2 GEL TOPICAL DAILY
Qty: 50 G | Refills: 1 | Status: SHIPPED | OUTPATIENT
Start: 2025-01-30

## 2025-01-30 NOTE — PROGRESS NOTES
Subjective:       Patient ID: Steve Castro is a 89 y.o. female.    Chief Complaint: Establish Care (Room 10 establish care, c/o arthritic pain in hands, 10/10)    The patient reported experiencing numbness and coldness in the hand, along with cramps primarily in the legs. The patient also mentioned stiffness and occasional tingling in the feet, which sometimes made it difficult to move. The patient described episodes of falling, particularly on the left side, with two instances resulting in stitches in the head over a year ago. The patient reported having difficulty with bowel movements and some swelling in the feet. Additionally, the patient experienced a lack of feeling in the left leg and hand. The patient expressed fear of falling, leading to decreased mobility. Pertinent negatives include no chest pain, shortness of breath, or known heart issues.        Current Outpatient Medications:     aspirin 81 MG Chew, Take 1 tablet (81 mg total) by mouth once daily., Disp: , Rfl:     multivitamin (THERAGRAN) per tablet, Take 1 tablet by mouth once daily., Disp: , Rfl:     diclofenac sodium (VOLTAREN ARTHRITIS PAIN) 1 % Gel, Apply 2 g topically once daily., Disp: 50 g, Rfl: 1    polyethylene glycol (GLYCOLAX) 17 gram PwPk, Take 17 g by mouth 2 (two) times daily as needed for Constipation. (Patient not taking: Reported on 1/30/2025), Disp: , Rfl:     Review of patient's allergies indicates:  No Known Allergies    Past Medical History:   Diagnosis Date    Chronic low back pain     Chronic pain of right knee     Mixed hyperlipidemia     Syncope     Vitamin D deficiency        History reviewed. No pertinent surgical history.    History reviewed. No pertinent family history.    Social History     Tobacco Use    Smoking status: Never    Smokeless tobacco: Never   Substance Use Topics    Alcohol use: Not Currently    Drug use: Never       Review of Systems   Constitutional:  Negative for fatigue and fever.   HENT:  Negative  "for nasal congestion, hearing loss, mouth dryness, sinus pressure/congestion, sneezing and sore throat.    Eyes:  Negative for pain and discharge.   Respiratory:  Negative for cough and shortness of breath.    Cardiovascular:  Negative for chest pain and claudication.   Gastrointestinal:  Negative for abdominal distention, abdominal pain, anal bleeding, change in bowel habit, constipation and diarrhea.   Endocrine: Negative for cold intolerance and heat intolerance.   Genitourinary:  Negative for bladder incontinence, difficulty urinating, dyspareunia, dysuria, genital sores and hot flashes.   Musculoskeletal:  Positive for arthralgias and leg pain. Negative for back pain, joint swelling and joint deformity.   Integumentary:  Negative for rash and wound.   Allergic/Immunologic: Negative for environmental allergies, food allergies and immunocompromised state.   Neurological:  Negative for light-headedness, headaches and coordination difficulties.   Hematological: Negative.    Psychiatric/Behavioral: Negative.           Current Medications:   Medication List with Changes/Refills   New Medications    DICLOFENAC SODIUM (VOLTAREN ARTHRITIS PAIN) 1 % GEL    Apply 2 g topically once daily.       Start Date: 1/30/2025 End Date: --   Current Medications    ASPIRIN 81 MG CHEW    Take 1 tablet (81 mg total) by mouth once daily.       Start Date: 6/5/2024  End Date: 6/5/2025    MULTIVITAMIN (THERAGRAN) PER TABLET    Take 1 tablet by mouth once daily.       Start Date: --        End Date: --    POLYETHYLENE GLYCOL (GLYCOLAX) 17 GRAM PWPK    Take 17 g by mouth 2 (two) times daily as needed for Constipation.       Start Date: 6/4/2024  End Date: --            Objective:        Vitals:    01/30/25 1005   BP: 113/67   BP Location: Left arm   Pulse: 91   Resp: 18   Temp: 98.5 °F (36.9 °C)   TempSrc: Oral   SpO2: 99%   Weight: 62.1 kg (137 lb)   Height: 5' 5" (1.651 m)       Physical Exam  Constitutional:       Appearance: She is " normal weight.   HENT:      Head: Normocephalic.   Cardiovascular:      Rate and Rhythm: Normal rate and regular rhythm.      Pulses: Normal pulses.      Heart sounds: Normal heart sounds.   Pulmonary:      Effort: Pulmonary effort is normal.      Breath sounds: Normal breath sounds.   Musculoskeletal:      Comments: Upper extremity and lower extremity stiffness likely from osteoarthritis   Skin:     General: Skin is warm and dry.   Neurological:      Mental Status: She is alert.             Lab Results   Component Value Date    WBC 8.16 06/04/2024    HGB 10.2 (L) 06/04/2024    HCT 30.6 (L) 06/04/2024     (L) 06/04/2024    CHOL 158 06/02/2024    TRIG 73 06/02/2024    HDL 63 (H) 06/02/2024    ALT 73 (H) 06/04/2024    AST 96 (H) 06/04/2024     06/04/2024    K 3.5 06/04/2024     (H) 06/04/2024    CREATININE 1.11 (H) 06/04/2024    BUN 25 (H) 06/04/2024    CO2 27 06/04/2024    TSH 1.030 06/01/2024    INR 1.14 06/01/2024    HGBA1C 5.4 06/01/2024      Assessment:       1. Frequent falls    2. Cramping of hands    3. Arthritis    4. Anemia, unspecified type    5. Osteoarthritis, unspecified osteoarthritis type, unspecified site    6. Debility        Plan:       1. Peripheral neuropathy: The primary concern is numbness and coldness in the hand, likely due to peripheral neuropathy, which is common in elderly patients and may be related to inadequate circulation or nerve compression. The patient's history of arthritis and limited mobility could contribute to these symptoms.   2. Musculoskeletal pain and stiffness: The patient has a history of arthritis and reports stiffness and cramps in the legs, indicating a musculoskeletal origin. The pain and stiffness could also be exacerbated by decreased mobility and age-related joint changes.   3. Risk of falls: The patient's history of falls, fear of further falls, and reported weakness suggest an increased risk of falls, possibly due to lower extremity weakness,  balance issues, or neuropathy. Addressing this risk is crucial to prevent further injury.     PLAN: Treatment: - Initiated physical therapy to improve strength and mobility. - Continued use of Tylenol for arthritis pain as needed.     Tests: - Ordered laboratory tests to check electrolyte levels, including potassium and sodium, to evaluate for any contributing factors to cramps and weakness.     Patient Education: - Educated the patient about the importance of maintaining mobility and potential home exercises to improve circulation and strength. - Discussed the role of physical therapy in reducing fall risk and improving quality of life.     Follow-Up: - Planned follow-up after physical therapy assessment and labs to adjust treatment based on findings. Disposition: - Arranged for home help to assist with daily activities and improve safety at home.     Problem List Items Addressed This Visit          Oncology    Anemia    Relevant Orders    CBC Auto Differential       Orthopedic    Osteoarthritis     Voltaren gel prescribed            Other    Debility     Vaccinations declined          Other Visit Diagnoses       Frequent falls    -  Primary    Relevant Orders    Ambulatory Referral/Consult to Physical/Occupational Therapy    Cramping of hands        Relevant Orders    Comprehensive Metabolic Panel    Magnesium    Arthritis        Relevant Medications    diclofenac sodium (VOLTAREN ARTHRITIS PAIN) 1 % Gel    Other Relevant Orders    CBC Auto Differential              Follow up in about 3 months (around 4/30/2025), or Follow up with strength.    Star Mensah MD     Instructed patient that if symptoms fail to improve or worsen patient should seek immediate medical attention or report to the nearest emergency department. Patient expressed verbal agreement and understanding to this plan of care.

## 2025-02-17 ENCOUNTER — CLINICAL SUPPORT (OUTPATIENT)
Dept: REHABILITATION | Facility: HOSPITAL | Age: OVER 89
End: 2025-02-17
Payer: MEDICARE

## 2025-02-17 DIAGNOSIS — R29.6 FREQUENT FALLS: ICD-10-CM

## 2025-02-17 PROCEDURE — 97162 PT EVAL MOD COMPLEX 30 MIN: CPT

## 2025-02-18 NOTE — PROGRESS NOTES
Physical Therapy Evaluation    Patient Name: Steve Castro  MRN: [id number]  YOB: 1932  Today's Date: 2/18/2025    Therapy Diagnosis:   Encounter Diagnosis   Name Primary?    Frequent falls      Physician: Janice Ponce DO    Physician Orders: Eval and Treat  Medical Diagnosis: frequent falls    Visit # / Visits Authorized:  1 / 25   Date of Evaluation:  2/17/2025   Insurance Authorization Period:  2/18/2025 to 5/18/2025  Plan of Care Certification:  2/17/2025 to 5/18/2025      Time In: 1600   Time Out: 1700  Total Time: 60   Total Billable Time: 55    Intake Outcome Measure for FOTO Survey    Therapist reviewed FOTO scores for Steve Castro on 2/17/2025.   FOTO report - see Media section or FOTO account episode details.     Intake Score: 21%    Precautions     Standard, fall      Subjective   History of Present Illness  Steve is a 89 y.o. female who reports to physical therapy with a chief concern of 2 year history of falls and increased fear of falling. According to the patient's chart, Steve has a past medical history of Chronic low back pain, Chronic pain of right knee, Mixed hyperlipidemia, Syncope, and Vitamin D deficiency. Steve has no past surgical history on file.    The patient reports a medical diagnosis of weakness.    Diagnostic tests related to this condition: X-ray.        History of Present Condition/Illness: Xray 6/1/2034} Osseous pelvis is intact.  Osteopenia is noted.  The right hip joint appears intact with no evidence of acute fracture or dislocation.  There is moderate to severe joint space loss and osteophytic spurring suggested at both hip joints.  No suspicious osseous or soft tissue lesions are identified.  6/1/2025  Findings suggestive of sequela of chronic microvascular ischemia.  If clinical symptoms persist or worsen, consider further evaluation with MRI imaging of the brain.    Activities of Daily Living  Social history was obtained from Patient and Other  (Comment). granddaughter        Patient Roles: Other (Comment)  Patient Responsibilities: Other (Comment)  Other Patient Roles and Responsibilities: patient stays close to the house, only goes to the doctor    Previously independent with activities of daily living? No  Activities previously needing assistance include Bathing, Functional mobility, and Toileting.   Currently independent with activities of daily living? No     Bathing: mod I /p setup; toileting:  uses BSC only,  dressing needs minimal help.  IADLs unable    Previously independent with instrumental activities of daily living? No  Activities previously needing assistance include: Health management, Meal prep, Medication management, Grocery/shopping, Financial management, Driving, and Community mobility.   Currently independent with instrumental activities of daily living? No  Activities currently needing assistance include: Health management, Meal prep, Medication management, Grocery/shopping, Financial management, Driving, and Community mobility.            Pain     Patient reports a current pain level of 0/10. Pain at best is reported as 0/10. Pain at worst is reported as 0/10.             Living Arrangements  Living Situation  Living Arrangements: Family members, Other (Comment)  Other Living Arrangements Comment: granddaughter-Aissatou  Support Systems: Family members    Home Setup  Home Access: Stairs without rails  Entrance Stairs - Number of Steps: 7  Number of Levels in Home: One level  Existing Accessibility Options: Walk-up entrance  Patient is able to live on main floor of home: Yes  Primary Bedroom: 1st floor  Primary Bathroom: 1st floor  Bathroom Toilet: Standard  Bathroom Shower/Tub: Tub/shower unit  Bathroom Equipment: Bedside commode  Kitchen: 1st floor  Laundry: 1st floor    Equipment/Treatments  Mobility Equipment: Rollator, Wheeled walker, Manual wheelchair, Straight cane        Employment  Employment Status: Retired          Past  Medical History/Physical Systems Review:   Steve Castro  has a past medical history of Chronic low back pain, Chronic pain of right knee, Mixed hyperlipidemia, Syncope, and Vitamin D deficiency.    Steve Castro  has no past surgical history on file.    Steve has a current medication list which includes the following prescription(s): aspirin, diclofenac sodium, multivitamin, and polyethylene glycol.    Review of patient's allergies indicates:  No Known Allergies     Objective      Right Lower Extremity Reflexes  Patellar, L4: Normal (2+)         Achilles, S1: Trace (1+)    Babinski reflex Absent.    Left Lower Extremity Reflexes  Patellar, L4: Normal (2+)          Achilles, S1: Trace (1+)    Babinski reflex Absent.        Hip Palpation  Right Hip Palpation  Unremarkable: Hip Muscle, Hip Bony Prominence/Bursa, and Hip Tendon/Ligament          Left Hip Palpation  Unremarkable: Hip Muscle, Hip Bony Prominence/Bursa, and Hip Tendon/Ligament           Knee Palpation  Right Knee Palpation  Unremarkable: Muscle, Bony Prominence/Bursa, and Tendon/Ligament          Left Knee Palpation  Unremarkable: Muscle, Bony Prominence/Bursa, and Tendon/Ligament               Hip Range of Motion   Right Hip   Active (deg) Passive (deg) Pain   Flexion   110     Extension   -10     ABduction   30     ADduction   10     External Rotation 90/90   30     External Rotation Prone         Internal Rotation 90/90   40     Internal Rotation Prone             Left Hip   Active (deg) Passive (deg) Pain   Flexion   110     Extension   -10     ABduction   30     ADduction   10     External Rotation 90/90   30     External Rotation Prone         Internal Rotation 90/90   40     Internal Rotation Prone                  Knee Range of Motion   Right Knee   Active (deg) Passive (deg) Pain   Flexion   -5     Extension   120         Left Knee   Active (deg) Passive (deg) Pain   Flexion   -10     Extension   120              Ankle/Foot Range of Motion    Right Ankle/Foot   Active (deg) Passive (deg) Pain   Dorsiflexion (KE)   10     Dorsiflexion (KF)         Plantar Flexion   50     Ankle Inversion         Ankle Eversion         Subtalar Inversion         Subtalar Eversion         Great Toe MTP Flexion         Great Toe MTP Extension         Great Toe IP Flexion             Left Ankle/Foot   Active (deg) Passive (deg) Pain   Dorsiflexion (KE)   -10     Dorsiflexion (KF)         Plantar Flexion   50     Ankle Inversion         Ankle Eversion         Subtalar Inversion         Subtalar Eversion         Great Toe MTP Flexion         Great Toe MTP Extension         Great Toe IP Flexion                            Hip Strength - Planes of Motion   Right Strength Right Pain Left Strength Left  Pain   Flexion (L2) 3+   3+     Extension 3+   3+     ABduction 3+   3+     ADduction 3+   3+     Internal Rotation 3+   3+     External Rotation 3+   3+         Knee Strength   Right Strength Right Pain Left Strength Left  Pain   Flexion (S2) 3+   3+     Prone Flexion           Extension (L3) 3+   3+            Ankle/Foot Strength - Planes of Motion   Right Strength Right Pain Left Strength Left  Pain   Dorsiflexion (L4) 3+   4-     Plantar Flexion (S1) 4-   4-     Inversion 3-   3-     Eversion 3-   3-     Great Toe Flexion 3-   3-     Great Toe Extension (L5) 3+   3+     Lesser Toes Flexion 3+   3+     Lesser Toes Extension 3+   3+            Transfers Assessment  Sit to Stand Assistance: Minimal assist  Sit to Stand Assistance Details: needs a lot of verbal cues  Chair to Bed Assistance: Minimal assist  Chair to Bed Assistance Details: needs a lot of  verbal cues  Bed to Chair Assistance: Minimal assist  Bed to Chair Assistance Details: needs a lot of verbal cues    Bed Mobility Assessment  Rolling Assistance: Supervision  Sidelying to Sit Assistance: Supervision  Sit to Sidelying Assistance: Supervision  Scooting to Edge of Bed Assistance: Supervision  Bridge/Boost to Head of  Bed Assistance: Supervision      Ambulation Assistance Required  Surface With  Assistive Device Without Assistive Device Details   Level Moderate assist Maximal assist      Uneven Moderate assist Maximal assist     Curb Moderate assist Maximal assist       Stairs Assistance Required   Assistance Level Upper Extremity Support Pattern   Ascending Moderate assist Two rails Non-reciprocal   Descending Moderate assist Two rails Non-reciprocal        Ambulation Details  Ambulation distance was 100 meters.      Gait Analysis  Gait Pattern: Ataxic  Walking Speed: Decreased    Right Side Walking Observations  Increased: Stance Time  Decreased: Step Length and Arm Swing  Right Foot Contact Pattern: Forefoot    Left Side Walking Observations  Increased: Stance Time  Decreased: Step Length and Arm Swing  Left Foot Contact Pattern: Forefoot  Trunk Observations During Gait: Forward lean    Knee Observations During Gait  Bilateral: Knee Decreased Extension in Midstance, Knee Hyperextension Stance Phase, and Increased Internal Rotation Tibial Torsion  Ankle/Foot Observations During Gait  Bilateral: Pronated Foot, Toe In During Gait, and Toe Out During Gait       Assessment & Plan   Assessment  Steve presents with a condition of Low complexity.   Presentation of Symptoms: Stable       Functional Limitations: Completing work/school activities, Bed mobility, Gait limitations, Standing tolerance, Transfers  Impairments: Activity intolerance, Impaired balance  Personal Factors Affecting Prognosis: Physical limitations    Patient Goal for Therapy (PT): get stronger, walk safely with less falling  Prognosis: Fair  Prognosis Details: Barrier to goal achievement is age    Plan  From a physical therapy perspective, the patient would benefit from: Skilled Rehab Services    Planned therapy interventions include: Therapeutic exercise, Therapeutic activities, Neuromuscular re-education, Manual therapy, ADLs/IADLs, and Gait training.             Visit Frequency: 2 times Per Week for 12 Weeks.       This plan was discussed with Patient and Family.   Discussion participants: Agreed Upon Plan of Care  Plan details: Patient has 2 sets of 3 steps to get from house to car.  Pt does not have a ramp but would benefit from.  Pt's granddaughter said that Catholic my help build a ramp.          Patient's spiritual, cultural, and educational needs considered and patient agreeable to plan of care and goals.     Education  Education was done with Patient. The patient's learning style includes Listening. The patient Verbalizes understanding.                 Goals:   Active       Ambulation/movement       Patient will walk with ' CGA       Start:  02/18/25    Expected End:  05/18/25               Changing body position       Patient will demonstrate ability to move from supine<>sit<>stand<>chair with SBA       Start:  02/18/25    Expected End:  05/18/25               Functional outcome       Patient will show a significant change in FOTO to  43 patient-reported outcome tool to demonstrate subjective improvement       Start:  02/18/25    Expected End:  05/18/25            Patient stated goal: get stronger, walk safely with less falling; patient will have no falls within PT treatment perior       Start:  02/18/25    Expected End:  05/18/25            Patient will demonstrate independence in home program for support of progression       Start:  02/18/25    Expected End:  05/18/25               Functional outcome       Patient will demonstrate independence in home program for support of progression       Start:  02/18/25    Expected End:  05/18/25                LORETA SOLORZANO, PT

## 2025-02-19 ENCOUNTER — CLINICAL SUPPORT (OUTPATIENT)
Dept: REHABILITATION | Facility: HOSPITAL | Age: OVER 89
End: 2025-02-19
Payer: MEDICARE

## 2025-02-19 DIAGNOSIS — R29.6 FREQUENT FALLS: Primary | ICD-10-CM

## 2025-02-19 PROCEDURE — 97110 THERAPEUTIC EXERCISES: CPT

## 2025-02-19 PROCEDURE — 97116 GAIT TRAINING THERAPY: CPT

## 2025-02-19 NOTE — PROGRESS NOTES
Outpatient Rehab    Physical Therapy Progress Note    Patient Name: Steve Castro  MRN: [id number]  YOB: 1932  Today's Date: 2/19/2025    Therapy Diagnosis:   Encounter Diagnosis   Name Primary?    Frequent falls Yes     Physician: Janice Ponce DO    Physician Orders: Eval and Treat     Medical Diagnosis: frequent falls     Visit # / Visits Authorized:  2 / 25   Date of Evaluation:  2/17/2025   Insurance Authorization Period:  2/18/2025 to 5/18/2025  Plan of Care Certification:  2/17/2025 to 5/18/2025   Time In: 0700   Time Out: 0802  Total Time: 62   Total Billable Time: 55    FOTO:  Intake Score: 21%  Survey Score 1:  %  Survey Score 2:  %         Subjective   Just a little achey in the knees and hands due to the rain.  Family / care giver present for this visit:   Pain reported as 0/10.      Objective           Treatment:  Therapeutic Exercise  Therapeutic Exercise Activity 1: cubii x 6 min  Therapeutic Exercise Activity 2: ankle df yellow x 20  Therapeutic Exercise Activity 3: ankle ever yellow x 20  Therapeutic Exercise Activity 4: ankle inver yellow x 20  Therapeutic Exercise Activity 5: knee flexion red x 30  Therapeutic Exercise Activity 6: knee ext red x 30  Therapeutic Exercise Activity 7: seated hip abd red x 30  Therapeutic Exercise Activity 8: seated hip flexion red x 30  Therapeutic Exercise Activity 9: seated hip add squeeze         Therapeutic Activity  Therapeutic Activity 1: standing heel raise x 20    Gait Training  Gait Training Activity 1: gait with RW min assist 70'    Assessment & Plan   patient arrived with granddaughter using [facility].  Patient performed LE therex with theraband.  Patient has medial/lateral ankle weakness greater than DF/PF.  Patient ambulated with RW 70' with a scissored pattern, flexed trunk. Unable to give HEP due to site problems.  Evaluation/Treatment Tolerance: Patient tolerated treatment well    Patient will continue to benefit from skilled  outpatient physical therapy to address the deficits listed in the problem list box on initial evaluation, provide pt/family education and to maximize pt's level of independence in the home and community environment.     Patient's spiritual, cultural, and educational needs considered and patient agreeable to plan of care and goals.     Education  Education was done with Patient. The patient's learning style includes Listening. The patient Demonstrates understanding and Verbalizes understanding.                 Plan: continue with current plan of care progressing towards established goals.    Goals:   Active       Ambulation/movement       Patient will walk with ' CGA (Progressing)       Start:  02/18/25    Expected End:  05/18/25               Changing body position       Patient will demonstrate ability to move from supine<>sit<>stand<>chair with SBA (Progressing)       Start:  02/18/25    Expected End:  05/18/25               Functional outcome       Patient will show a significant change in FOTO to  43 patient-reported outcome tool to demonstrate subjective improvement (Progressing)       Start:  02/18/25    Expected End:  05/18/25            Patient stated goal: get stronger, walk safely with less falling; patient will have no falls within PT treatment perior (Progressing)       Start:  02/18/25    Expected End:  05/18/25            Patient will demonstrate independence in home program for support of progression (Progressing)       Start:  02/18/25    Expected End:  05/18/25               Functional outcome       Patient will demonstrate independence in home program for support of progression (Progressing)       Start:  02/18/25    Expected End:  05/18/25                LORETA SOLORZANO, PT

## 2025-02-26 ENCOUNTER — CLINICAL SUPPORT (OUTPATIENT)
Dept: REHABILITATION | Facility: HOSPITAL | Age: OVER 89
End: 2025-02-26
Payer: MEDICARE

## 2025-02-26 DIAGNOSIS — R29.6 FREQUENT FALLS: Primary | ICD-10-CM

## 2025-02-26 PROCEDURE — 97116 GAIT TRAINING THERAPY: CPT | Mod: CQ

## 2025-02-26 PROCEDURE — 97110 THERAPEUTIC EXERCISES: CPT | Mod: CQ

## 2025-02-26 NOTE — PROGRESS NOTES
Outpatient Rehab    Physical Therapy Visit    Patient Name: Steve Castro  MRN: [id number]  YOB: 1932  Encounter Date: 2/26/2025    Therapy Diagnosis: No diagnosis found.  Physician: Janice Ponce DO    Physician Orders: Eval and Treat  Medical Diagnosis: frequent falls     Visit # / Visits Authorized:  3/ 25   Date of Evaluation:  2/17/2025   Insurance Authorization Period:  2/18/2025 to 5/18/2025  Plan of Care Certification:  2/17/2025 to 5/18/2025      Time In: 1515   Time Out: 1603  Total Time: 48   Total Billable Time: 48    FOTO:  Intake Score:  %  Survey Score 1:  %  Survey Score 2:  %         Subjective   I do not have any pain in my hands or feet. When I got up this morning i just did not feel well..  Family / care giver present for this visit:          Objective            Treatment:  Therapeutic Exercise  Therapeutic Exercise Activity 1: cubii x 6 min  Therapeutic Exercise Activity 2: ankle df yellow x 20  Therapeutic Exercise Activity 5: knee flexion red x 30  Therapeutic Exercise Activity 7: seated hip abd red x 30  Therapeutic Exercise Activity 8: seated hip flexion red x 30  Therapeutic Exercise Activity 9: seated hip add squeeze  Therapeutic Exercise Activity 10: standing heel raise x 20    Gait Training  Gait Training Activity 1: gait with RW min assist 42'    Assessment & Plan   Assessment: patient arrived with granddaughter using [facility].  Patient performed LE therex with theraband.  Patient has medial/lateral ankle weakness greater than DF/PF.  Patient ambulated with RW 42' with flexed trunk. HEP was given today. Reviewed with manuel. Ruby kept saying that she was fatigued and in pain today. she took rest breaks as needed.  Evaluation/Treatment Tolerance: Patient limited by fatigue    Patient will continue to benefit from skilled outpatient physical therapy to address the deficits listed in the problem list box on initial evaluation, provide pt/family  education and to maximize pt's level of independence in the home and community environment.     Patient's spiritual, cultural, and educational needs considered and patient agreeable to plan of care and goals.     Education  Education was done with Patient. The patient's learning style includes Listening. The patient Demonstrates understanding and Verbalizes understanding.                 Plan: continue with current plan of care progressing towards established goals.    Goals:   Active       Ambulation/movement       Patient will walk with ' CGA (Progressing)       Start:  02/18/25    Expected End:  05/18/25               Changing body position       Patient will demonstrate ability to move from supine<>sit<>stand<>chair with SBA (Progressing)       Start:  02/18/25    Expected End:  05/18/25               Functional outcome       Patient will show a significant change in FOTO to  43 patient-reported outcome tool to demonstrate subjective improvement (Progressing)       Start:  02/18/25    Expected End:  05/18/25            Patient stated goal: get stronger, walk safely with less falling; patient will have no falls within PT treatment perior (Progressing)       Start:  02/18/25    Expected End:  05/18/25            Patient will demonstrate independence in home program for support of progression (Progressing)       Start:  02/18/25    Expected End:  05/18/25               Functional outcome       Patient will demonstrate independence in home program for support of progression (Progressing)       Start:  02/18/25    Expected End:  05/18/25                Isa Rodgers PTA

## 2025-03-03 ENCOUNTER — CLINICAL SUPPORT (OUTPATIENT)
Dept: REHABILITATION | Facility: HOSPITAL | Age: OVER 89
End: 2025-03-03
Payer: MEDICARE

## 2025-03-03 DIAGNOSIS — M19.90 OSTEOARTHRITIS, UNSPECIFIED OSTEOARTHRITIS TYPE, UNSPECIFIED SITE: Primary | ICD-10-CM

## 2025-03-03 PROCEDURE — 97110 THERAPEUTIC EXERCISES: CPT | Mod: CQ

## 2025-03-03 PROCEDURE — 97116 GAIT TRAINING THERAPY: CPT | Mod: CQ

## 2025-03-03 NOTE — PROGRESS NOTES
Outpatient Rehab    Physical Therapy Visit    Patient Name: Steve Castro  MRN: [id number]  YOB: 1932  Encounter Date: 3/3/2025    Therapy Diagnosis: No diagnosis found.  Physician: Janice Ponce DO    Physician Orders: Eval and Treat  Medical Diagnosis: frequent falls     Visit # / Visits Authorized:  4/ 25   PTA VISIT 2/5  Date of Evaluation:  2/17/2025   Insurance Authorization Period:  2/18/2025 to 5/18/2025  Plan of Care Certification:  2/17/2025 to 5/18/2025      Time In: 1434   Time Out: 1508  Total Time: 34   Total Billable Time: 34    FOTO:  Intake Score:  %  Survey Score 1:  %  Survey Score 2:  %         Subjective   My left leg is hurting alot today.  Family / care giver present for this visit:   Pain reported as 9/10.      Objective            Treatment:  Therapeutic Exercise  Therapeutic Exercise Activity 1: cubii x 3 min  Therapeutic Exercise Activity 5: knee flexion red x 30  Therapeutic Exercise Activity 6: knee ext red x 30  Therapeutic Exercise Activity 7: seated hip abd red x 20  Therapeutic Exercise Activity 8: seated hip flexion red x 30  Therapeutic Exercise Activity 10: standing heel raise x 20         Assessment & Plan   Assessment: patient arrived with granddaughter using [facility].  Patient performed LE therex with theraband and had difficulty with exercises involving the LLE.   Patient ambulated 39ft with RW again today with forward flexed trunk.  Patient kept stating that she was fatigued during exercises today secondary to difficulty with the LLE. she took rest breaks as needed. Will cont to progress as able.  Evaluation/Treatment Tolerance: Patient limited by fatigue    Patient will continue to benefit from skilled outpatient physical therapy to address the deficits listed in the problem list box on initial evaluation, provide pt/family education and to maximize pt's level of independence in the home and community environment.     Patient's spiritual, cultural, and  educational needs considered and patient agreeable to plan of care and goals.           Plan: continue with current plan of care progressing towards established goals.    Goals:   Active       Ambulation/movement       Patient will walk with ' CGA (Progressing)       Start:  02/18/25    Expected End:  05/18/25               Changing body position       Patient will demonstrate ability to move from supine<>sit<>stand<>chair with SBA (Progressing)       Start:  02/18/25    Expected End:  05/18/25               Functional outcome       Patient will show a significant change in FOTO to  43 patient-reported outcome tool to demonstrate subjective improvement (Progressing)       Start:  02/18/25    Expected End:  05/18/25            Patient stated goal: get stronger, walk safely with less falling; patient will have no falls within PT treatment perior (Progressing)       Start:  02/18/25    Expected End:  05/18/25            Patient will demonstrate independence in home program for support of progression (Progressing)       Start:  02/18/25    Expected End:  05/18/25               Functional outcome       Patient will demonstrate independence in home program for support of progression (Progressing)       Start:  02/18/25    Expected End:  05/18/25                Rohit Galan PTA

## 2025-03-05 ENCOUNTER — CLINICAL SUPPORT (OUTPATIENT)
Dept: REHABILITATION | Facility: HOSPITAL | Age: OVER 89
End: 2025-03-05
Payer: MEDICARE

## 2025-03-05 DIAGNOSIS — M19.90 OSTEOARTHRITIS, UNSPECIFIED OSTEOARTHRITIS TYPE, UNSPECIFIED SITE: ICD-10-CM

## 2025-03-05 DIAGNOSIS — R29.6 FREQUENT FALLS: Primary | ICD-10-CM

## 2025-03-05 PROCEDURE — 97110 THERAPEUTIC EXERCISES: CPT

## 2025-03-05 PROCEDURE — 97116 GAIT TRAINING THERAPY: CPT

## 2025-03-05 PROCEDURE — 97530 THERAPEUTIC ACTIVITIES: CPT

## 2025-03-05 NOTE — PROGRESS NOTES
Outpatient Rehab    Physical Therapy Progress Note    Patient Name: Steve Castro  MRN: [id number]  YOB: 1932  Encounter Date: 3/5/2025    Therapy Diagnosis: weakness  Physician: Janice Ponce DO    Physician Orders: Eval and Treat  Medical Diagnosis: frequent falls  Date of Evaluation:  2/17/2025   Insurance Authorization Period:  2/18/2025 to 5/18/2025  Plan of Care Certification:  2/17/2025 to 5/18/2025        Time In: 1505   Time Out: 1600  Total Time: 55   Total Billable Time: 55    FOTO:  Intake Score: 21%  Survey Score 1:  %  Survey Score 2:  %    Precautions     Standard, fall      Subjective   Left leg is hurting today.  Family / care giver present for this visit:   Pain reported as 7/10.      Objective           Treatment:  Therapeutic Exercise  Therapeutic Exercise Activity 1: nustep x 5 min  Therapeutic Exercise Activity 2: hip abd/add x 20  Therapeutic Exercise Activity 3: heel slide/quad set x 20  Therapeutic Exercise Activity 4: SLR x 20         Balance/Neuromuscular Re-Education  Balance/Neuromuscular Re-Education Activity 1: bridges x 20    Therapeutic Activity  Therapeutic Activity 1: transfer training supine<>sit<>stand<>chair with walker    Gait Training  Gait Training Activity 1: gait with RW 75' x 2 min assist    Assessment & Plan   Assessment: Patient ambulates with RW 75' min assist, requiring cues to increase axial /, increase stride width.  Patient was really bothered by left LE pain today having difficulty straightening left knee for SLR but was able to fully extend knee at midstance.       Patient will continue to benefit from skilled outpatient physical therapy to address the deficits listed in the problem list box on initial evaluation, provide pt/family education and to maximize pt's level of independence in the home and community environment.     Patient's spiritual, cultural, and educational needs considered and patient agreeable to plan of care and goals.      Education  Education was done with Patient. The patient's learning style includes Listening. The patient Requires assistance.         Proper form for exercise, and gait       Plan: continue with current plan of care progressing towards established goals.    Goals:   Active       Ambulation/movement       Patient will walk with ' CGA (Progressing)       Start:  02/18/25    Expected End:  05/18/25               Changing body position       Patient will demonstrate ability to move from supine<>sit<>stand<>chair with SBA (Progressing)       Start:  02/18/25    Expected End:  05/18/25               Functional outcome       Patient will show a significant change in FOTO to  43 patient-reported outcome tool to demonstrate subjective improvement (Progressing)       Start:  02/18/25    Expected End:  05/18/25            Patient stated goal: get stronger, walk safely with less falling; patient will have no falls within PT treatment perior (Progressing)       Start:  02/18/25    Expected End:  05/18/25            Patient will demonstrate independence in home program for support of progression (Progressing)       Start:  02/18/25    Expected End:  05/18/25               Functional outcome       Patient will demonstrate independence in home program for support of progression (Progressing)       Start:  02/18/25    Expected End:  05/18/25                LORETA SOLORZANO, PT

## 2025-03-10 ENCOUNTER — CLINICAL SUPPORT (OUTPATIENT)
Dept: REHABILITATION | Facility: HOSPITAL | Age: OVER 89
End: 2025-03-10
Payer: MEDICARE

## 2025-03-10 DIAGNOSIS — M19.90 OSTEOARTHRITIS, UNSPECIFIED OSTEOARTHRITIS TYPE, UNSPECIFIED SITE: Primary | ICD-10-CM

## 2025-03-10 DIAGNOSIS — R29.6 FREQUENT FALLS: ICD-10-CM

## 2025-03-10 NOTE — PROGRESS NOTES
Outpatient Rehab    Physical Therapy Progress Note    Patient Name: Steve Castro  MRN: [id number]  YOB: 1932  Encounter Date: 3/10/2025    Therapy Diagnosis: weakness  Physician: Janice Ponce DO    Physician Orders: Eval and Treat  Medical Diagnosis: weakness    Date of Evaluation:  2/17/2025   Insurance Authorization Period:  2/18/2025 to 5/18/2025  Plan of Care Certification:  2/17/2025 to 5/18/2025        Time In: 1500   Time Out: 1600  Total Time: 60   Total Billable Time: 55    FOTO:  Intake Score: 21%  Survey Score 1:  %  Survey Score 2:  %    Precautions     Standard, fall      Subjective   Left leg is hurting.  Family / care giver present for this visit:   Pain reported as 8/10.      Objective           Treatment:  Therapeutic Exercise  Therapeutic Exercise Activity 1: cubii, (aa), x 5 min  Therapeutic Exercise Activity 2: seated hip abd red x 30  Therapeutic Exercise Activity 3: seated marches yellow x 30  Therapeutic Exercise Activity 4: seated knee flexion yellow 3x10  Therapeutic Exercise Activity 5: seated knee extension- left x 20, right  yellow x 30  Therapeutic Exercise Activity 6: hip add squeeze 3x10    Manual Therapy  Manual Therapy Activity 1: heel cord stretch 5x30 sh    Gait Training  Gait Training Activity 1: gait with RW 40' x  x 2 min assist    Assessment & Plan   Assessment: Patient ambulated 40' with min assist, manual assist for stearing, demonstrated decreased stride length and adriana, and narrow LEIA.  Evaluation/Treatment Tolerance: Patient limited by pain    Patient will continue to benefit from skilled outpatient physical therapy to address the deficits listed in the problem list box on initial evaluation, provide pt/family education and to maximize pt's level of independence in the home and community environment.     Patient's spiritual, cultural, and educational needs considered and patient agreeable to plan of care and goals.           Plan: continue with current  plan of care progressing towards established goals.    Goals:   Active       Ambulation/movement       Patient will walk with ' CGA (Progressing)       Start:  02/18/25    Expected End:  05/18/25               Changing body position       Patient will demonstrate ability to move from supine<>sit<>stand<>chair with SBA (Progressing)       Start:  02/18/25    Expected End:  05/18/25               Functional outcome       Patient will show a significant change in FOTO to  43 patient-reported outcome tool to demonstrate subjective improvement (Progressing)       Start:  02/18/25    Expected End:  05/18/25            Patient stated goal: get stronger, walk safely with less falling; patient will have no falls within PT treatment perior (Progressing)       Start:  02/18/25    Expected End:  05/18/25            Patient will demonstrate independence in home program for support of progression (Progressing)       Start:  02/18/25    Expected End:  05/18/25               Functional outcome       Patient will demonstrate independence in home program for support of progression (Progressing)       Start:  02/18/25    Expected End:  05/18/25                LORETA SOLORZANO, PT

## 2025-03-12 ENCOUNTER — CLINICAL SUPPORT (OUTPATIENT)
Dept: REHABILITATION | Facility: HOSPITAL | Age: OVER 89
End: 2025-03-12
Payer: MEDICARE

## 2025-03-12 DIAGNOSIS — M19.90 OSTEOARTHRITIS, UNSPECIFIED OSTEOARTHRITIS TYPE, UNSPECIFIED SITE: Primary | ICD-10-CM

## 2025-03-12 DIAGNOSIS — R29.6 FREQUENT FALLS: ICD-10-CM

## 2025-03-12 PROCEDURE — 97116 GAIT TRAINING THERAPY: CPT

## 2025-03-12 PROCEDURE — 97110 THERAPEUTIC EXERCISES: CPT

## 2025-03-12 NOTE — PROGRESS NOTES
Outpatient Rehab    Physical Therapy Progress Note    Patient Name: Steve Castro  MRN: [id number]  YOB: 1932  Encounter Date: 3/12/2025    Therapy Diagnosis:   Encounter Diagnoses   Name Primary?    Osteoarthritis, unspecified osteoarthritis type, unspecified site Yes    Frequent falls      Physician: Janice Ponce DO    Physician Orders: Eval and Treat  Medical Diagnosis:   Encounter Diagnoses   Name Primary?    Osteoarthritis, unspecified osteoarthritis type, unspecified site Yes    Frequent falls      Date of Evaluation:  2/17/2025   Insurance Authorization Period:  2/18/2025 to 5/18/2025  Plan of Care Certification:  2/17/2025 to 5/18/2025        Time In: 1500   Time Out: 1600  Total Time: 60   Total Billable Time: 55    FOTO:  Intake Score: 21%  Survey Score 1:  %  Survey Score 2:  %         Subjective   left knee hurting.  Pain reported as 8/10.      Objective           Treatment:  Therapeutic Exercise  Therapeutic Exercise Activity 1: nustep 10 min  Therapeutic Exercise Activity 2: seated hip abd red x 30  Therapeutic Exercise Activity 3: seated marches red x 30  Therapeutic Exercise Activity 4: seated knee flexion yellow 3x10  Therapeutic Exercise Activity 5: seated knee extension-  yellow x 30  Therapeutic Exercise Activity 6: hip add squeeze x30                   Gait Training  Gait Training Activity 1: gait with RW 40' x  x 2 min assist    Assessment & Plan   Assessment: patient had less c/o left knee pain today, was able to perform therex with improved form.  Patient ambulated with increased distance.       Patient will continue to benefit from skilled outpatient physical therapy to address the deficits listed in the problem list box on initial evaluation, provide pt/family education and to maximize pt's level of independence in the home and community environment.     Patient's spiritual, cultural, and educational needs considered and patient agreeable to plan of care and goals.            Plan: continue with current plan of care progressing towards established goals.    Goals:   Active       Ambulation/movement       Patient will walk with ' CGA (Progressing)       Start:  02/18/25    Expected End:  05/18/25               Changing body position       Patient will demonstrate ability to move from supine<>sit<>stand<>chair with SBA (Progressing)       Start:  02/18/25    Expected End:  05/18/25               Functional outcome       Patient will show a significant change in FOTO to  43 patient-reported outcome tool to demonstrate subjective improvement (Progressing)       Start:  02/18/25    Expected End:  05/18/25            Patient stated goal: get stronger, walk safely with less falling; patient will have no falls within PT treatment perior (Progressing)       Start:  02/18/25    Expected End:  05/18/25            Patient will demonstrate independence in home program for support of progression (Progressing)       Start:  02/18/25    Expected End:  05/18/25               Functional outcome       Patient will demonstrate independence in home program for support of progression (Progressing)       Start:  02/18/25    Expected End:  05/18/25                LORETA SOLORZANO, PT.

## 2025-03-17 ENCOUNTER — CLINICAL SUPPORT (OUTPATIENT)
Dept: REHABILITATION | Facility: HOSPITAL | Age: OVER 89
End: 2025-03-17
Payer: MEDICARE

## 2025-03-17 DIAGNOSIS — R29.6 FREQUENT FALLS: ICD-10-CM

## 2025-03-17 DIAGNOSIS — M19.90 OSTEOARTHRITIS, UNSPECIFIED OSTEOARTHRITIS TYPE, UNSPECIFIED SITE: Primary | ICD-10-CM

## 2025-03-17 NOTE — PROGRESS NOTES
"  Outpatient Rehab    Physical Therapy Progress Note    Patient Name: Steve Castro  MRN: [id number]  YOB: 1932  Encounter Date: 3/17/2025    Therapy Diagnosis:   Encounter Diagnoses   Name Primary?    Osteoarthritis, unspecified osteoarthritis type, unspecified site Yes    Frequent falls      Physician: Janice Ponce DO    Physician Orders: Eval and Treat  Medical Diagnosis: Frequent falls    Visit # / Visits Authorized:  7 / 16  Date of Evaluation: 2/17/2025  Insurance Authorization Period: 2/17/2025 to 1/31/2027  Plan of Care Certification:  2/17/2025 to 5/18/2025      PT/PTA: PT   Number of PTA visits since last PT visit:0  Time In: 1600   Time Out: 1700  Total Time: 60   Total Billable Time: 55    FOTO:  Intake Score: 21%  Survey Score 1:  %  Survey Score 2:  %         Subjective   "legs feel stiff.  Pain reported as 0/10.      Objective           Treatment:  Therapeutic Exercise  TE 1: cubii 5 min level 1  TE 2: seated hip abd red x 30  TE 3: seated marches red x 30  TE 4: seated knee flexion yellow 3x10  TE 5: seated knee extension-  yellow x 30  TE 6: hip add squeeze x30  Manual Therapy  MT 1: heel cord stretch 5x30 sh  Gait Training  GT 1: gait with RW 43' x  x 2 min assist    Time Entry(in minutes):  Gait Training Time Entry: 15  Manual Therapy Time Entry: 5  Therapeutic Exercise Time Entry: 35    Assessment & Plan   Assessment: Patient had less LE pain, was able to ambulate a littler farther today.  Pt exercised today /s increased c/o.       Patient will continue to benefit from skilled outpatient physical therapy to address the deficits listed in the problem list box on initial evaluation, provide pt/family education and to maximize pt's level of independence in the home and community environment.     Patient's spiritual, cultural, and educational needs considered and patient agreeable to plan of care and goals.           Plan: continue with current plan of care progressing towards " established goals.    Goals:   Active       Ambulation/movement       Patient will walk with ' CGA (Progressing)       Start:  02/18/25    Expected End:  05/18/25               Changing body position       Patient will demonstrate ability to move from supine<>sit<>stand<>chair with SBA (Progressing)       Start:  02/18/25    Expected End:  05/18/25               Functional outcome       Patient will show a significant change in FOTO to  43 patient-reported outcome tool to demonstrate subjective improvement (Progressing)       Start:  02/18/25    Expected End:  05/18/25            Patient stated goal: get stronger, walk safely with less falling; patient will have no falls within PT treatment perior (Progressing)       Start:  02/18/25    Expected End:  05/18/25            Patient will demonstrate independence in home program for support of progression (Progressing)       Start:  02/18/25    Expected End:  05/18/25               Functional outcome       Patient will demonstrate independence in home program for support of progression (Progressing)       Start:  02/18/25    Expected End:  05/18/25                LORETA SOLORZANO, PT

## 2025-03-19 ENCOUNTER — CLINICAL SUPPORT (OUTPATIENT)
Dept: REHABILITATION | Facility: HOSPITAL | Age: OVER 89
End: 2025-03-19
Payer: MEDICARE

## 2025-03-19 DIAGNOSIS — R29.6 FREQUENT FALLS: ICD-10-CM

## 2025-03-19 DIAGNOSIS — M19.90 OSTEOARTHRITIS, UNSPECIFIED OSTEOARTHRITIS TYPE, UNSPECIFIED SITE: Primary | ICD-10-CM

## 2025-03-19 PROCEDURE — 97110 THERAPEUTIC EXERCISES: CPT

## 2025-03-19 PROCEDURE — 97116 GAIT TRAINING THERAPY: CPT

## 2025-03-19 NOTE — PROGRESS NOTES
Outpatient Rehab    Physical Therapy Progress Note    Patient Name: Steve Castro  MRN: [id number]  YOB: 1932  Encounter Date: 3/19/2025    Therapy Diagnosis:   Encounter Diagnoses   Name Primary?    Osteoarthritis, unspecified osteoarthritis type, unspecified site Yes    Frequent falls      Physician: Janice Ponce DO    Physician Orders: Eval and Treat  Medical Diagnosis: Frequent falls    Visit # / Visits Authorized:  8 / 16    Date of Evaluation:  2/17/2025     Plan of Care Certification:  2/17/2025 to 5/18/2025     Insurance Authorization Period: 2/17/2025 to 1/31/2027     PT/PTA: PT   Number of PTA visits since last PT visit:0  Time In: 1500   Time Out: 1600  Total Time: 60   Total Billable Time: 55    FOTO:  Intake Score: 21%  Survey Score 1:  %  Survey Score 2:  %         Subjective   body is achey, i feel it's going to rain.  Family / care giver present for this visit:          Objective           Treatment:  Therapeutic Exercise  TE 1: nustep 5 min  TE 2: seated hip abd red x 30  TE 3: seated marches red x 30  TE 4: seated knee flexion yellow 3x10  TE 5: seated knee extension-  yellow x 30  TE 6: hip add squeeze x30  Therapeutic Activity  TA 1: standing heel raise x 10  Gait Training  GT 1: gait with RW 35', 30', 42', 30'    Time Entry(in minutes):  Gait Training Time Entry: 20  Manual Therapy Time Entry: 5  Therapeutic Exercise Time Entry: 30    Assessment & Plan   Assessment: Patient had multi joint soreness due to OA.  Patient ambulated with increased frequency today.  Pt demonstrates severe kyphosis in standing and scissored gait.  Evaluation/Treatment Tolerance: Patient limited by pain    Patient will continue to benefit from skilled outpatient physical therapy to address the deficits listed in the problem list box on initial evaluation, provide pt/family education and to maximize pt's level of independence in the home and community environment.     Patient's spiritual, cultural, and  educational needs considered and patient agreeable to plan of care and goals.           Plan: continue with current plan of care progressing towards established goals.    Goals:   Active       Ambulation/movement       Patient will walk with ' CGA (Progressing)       Start:  02/18/25    Expected End:  05/18/25               Changing body position       Patient will demonstrate ability to move from supine<>sit<>stand<>chair with SBA (Progressing)       Start:  02/18/25    Expected End:  05/18/25               Functional outcome       Patient will show a significant change in FOTO to  43 patient-reported outcome tool to demonstrate subjective improvement (Progressing)       Start:  02/18/25    Expected End:  05/18/25            Patient stated goal: get stronger, walk safely with less falling; patient will have no falls within PT treatment perior (Progressing)       Start:  02/18/25    Expected End:  05/18/25            Patient will demonstrate independence in home program for support of progression (Progressing)       Start:  02/18/25    Expected End:  05/18/25               Functional outcome       Patient will demonstrate independence in home program for support of progression (Progressing)       Start:  02/18/25    Expected End:  05/18/25                LORETA SOLORZANO, PT

## 2025-03-24 ENCOUNTER — CLINICAL SUPPORT (OUTPATIENT)
Dept: REHABILITATION | Facility: HOSPITAL | Age: OVER 89
End: 2025-03-24
Payer: MEDICARE

## 2025-03-24 DIAGNOSIS — M19.90 OSTEOARTHRITIS, UNSPECIFIED OSTEOARTHRITIS TYPE, UNSPECIFIED SITE: Primary | ICD-10-CM

## 2025-03-24 PROCEDURE — 97116 GAIT TRAINING THERAPY: CPT

## 2025-03-24 PROCEDURE — 97530 THERAPEUTIC ACTIVITIES: CPT

## 2025-03-24 NOTE — PROGRESS NOTES
"  Outpatient Rehab    Physical Therapy Progress Note    Patient Name: Steve Castro  MRN: [id number]  YOB: 1932  Encounter Date: 3/24/2025    Therapy Diagnosis:   Encounter Diagnosis   Name Primary?    Osteoarthritis, unspecified osteoarthritis type, unspecified site Yes     Physician: Janice Ponce DO    Physician Orders: Eval and Treat  Medical Diagnosis: Frequent falls    Visit # / Visits Authorized:  9 / 16  Insurance Authorization Period: 2/17/2025 to 1/31/2027  Date of Evaluation: 2/17/2025  Plan of Care Certification: 2/17/2025 to 5/18/2025     PT/PTA: PT   Number of PTA visits since last PT visit:0  Time In: 1600   Time Out: 1700  Total Time: 60   Total Billable Time:      FOTO:  Intake Score:  %  Survey Score 1:  %  Survey Score 2:  %    Precautions     Standard, fall      Subjective   "left hip feels like a cramp".  Family / care giver present for this visit:   Pain reported as 0/10.      Objective           Treatment:  Therapeutic Exercise  TE 1: cubii 5 min  TE 2: seated hip abd red x 30  TE 3: seated marches red x 30  TE 4: seated knee flexion yellow 3x10  TE 5: seated knee extension-  yellow x 30  TE 6: hip add squeeze x30  Gait Training  GT 1: gait with RW 60', 42' min assist    Time Entry(in minutes):  Gait Training Time Entry: 15  Therapeutic Activity Time Entry: 40    Assessment & Plan   Assessment: Patient ambulated with RW min assist, 60', 42', improved distance from previous treatment.  Patient demonstrates severe axial flexion, scissored pattern, diminished heel/toe pattern, and approx 75% decreased adriana.  Evaluation/Treatment Tolerance: Patient limited by fatigue    Patient will continue to benefit from skilled outpatient physical therapy to address the deficits listed in the problem list box on initial evaluation, provide pt/family education and to maximize pt's level of independence in the home and community environment.     Patient's spiritual, cultural, and educational " needs considered and patient agreeable to plan of care and goals.           Plan: continue with current plan of care progressing towards established goals.    Goals:   Active       Ambulation/movement       Patient will walk with ' CGA (Progressing)       Start:  02/18/25    Expected End:  05/18/25               Changing body position       Patient will demonstrate ability to move from supine<>sit<>stand<>chair with SBA (Progressing)       Start:  02/18/25    Expected End:  05/18/25               Functional outcome       Patient will show a significant change in FOTO to  43 patient-reported outcome tool to demonstrate subjective improvement (Progressing)       Start:  02/18/25    Expected End:  05/18/25            Patient stated goal: get stronger, walk safely with less falling; patient will have no falls within PT treatment perior (Progressing)       Start:  02/18/25    Expected End:  05/18/25            Patient will demonstrate independence in home program for support of progression (Progressing)       Start:  02/18/25    Expected End:  05/18/25               Functional outcome       Patient will demonstrate independence in home program for support of progression (Progressing)       Start:  02/18/25    Expected End:  05/18/25                LORETA SOLORZANO, PT

## 2025-03-26 ENCOUNTER — CLINICAL SUPPORT (OUTPATIENT)
Dept: REHABILITATION | Facility: HOSPITAL | Age: OVER 89
End: 2025-03-26
Payer: MEDICARE

## 2025-03-26 DIAGNOSIS — R29.6 FREQUENT FALLS: ICD-10-CM

## 2025-03-26 DIAGNOSIS — M19.90 OSTEOARTHRITIS, UNSPECIFIED OSTEOARTHRITIS TYPE, UNSPECIFIED SITE: Primary | ICD-10-CM

## 2025-03-26 PROCEDURE — 97110 THERAPEUTIC EXERCISES: CPT

## 2025-03-26 PROCEDURE — 97116 GAIT TRAINING THERAPY: CPT

## 2025-03-26 NOTE — PROGRESS NOTES
"  Outpatient Rehab    Physical Therapy Progress Note    Patient Name: Steve Castro  MRN: [id number]  YOB: 1932  Encounter Date: 3/26/2025    Therapy Diagnosis:   Encounter Diagnoses   Name Primary?    Osteoarthritis, unspecified osteoarthritis type, unspecified site Yes    Frequent falls      Physician: Janice Ponce DO    Physician Orders: Eval and Treat  Medical Diagnosis: Frequent falls    Visit # / Visits Authorized:  10 / 16  Insurance Authorization Period: 2/17/2025 to 1/31/2027  Date of Evaluation: 2/17/2025  Plan of Care Certification: 2/17/2025 to 5/18/2025     PT/PTA: PT   Number of PTA visits since last PT visit:0  Time In: 1455   Time Out: 1555  Total Time: 60   Total Billable Time:  60    FOTO:  Intake Score: 21%  Survey Score 1:  %  Survey Score 2:  %    Precautions     Standard, fall      Subjective   "I hurt some".  Pain reported as 5/10. BLE    Objective           Treatment:  Therapeutic Exercise  TE 1: cubii 5 min  TE 2: seated hip abd red x 30  TE 3: seated marches red x 30  TE 4: seated knee flexion yellow 3x10  TE 5: seated knee extension-  yellow left x 15, right x 30  TE 6: hip add squeeze x30  TE 7: ankle pf/df red x 30  TE 8: ankle ever/inver yellow 3x10  Gait Training  GT 1: gait with RW 60', 30' min assist    Time Entry(in minutes):  Gait Training Time Entry: 15  Therapeutic Exercise Time Entry: 45    Assessment & Plan   Assessment: Patient ambulated with RW CGA, demonstrating flexed posture, scissored step pattern, approx 75% decreased adriaan.  Patient was able to complete session with increased exercise tolerance.       Patient will continue to benefit from skilled outpatient physical therapy to address the deficits listed in the problem list box on initial evaluation, provide pt/family education and to maximize pt's level of independence in the home and community environment.     Patient's spiritual, cultural, and educational needs considered and patient agreeable to " plan of care and goals.           Plan: continue with current plan of care progressing towards established goals.    Goals:   Active       Ambulation/movement       Patient will walk with ' CGA (Progressing)       Start:  02/18/25    Expected End:  05/18/25               Changing body position       Patient will demonstrate ability to move from supine<>sit<>stand<>chair with SBA (Progressing)       Start:  02/18/25    Expected End:  05/18/25               Functional outcome       Patient will show a significant change in FOTO to  43 patient-reported outcome tool to demonstrate subjective improvement (Progressing)       Start:  02/18/25    Expected End:  05/18/25            Patient stated goal: get stronger, walk safely with less falling; patient will have no falls within PT treatment perior (Progressing)       Start:  02/18/25    Expected End:  05/18/25            Patient will demonstrate independence in home program for support of progression (Progressing)       Start:  02/18/25    Expected End:  05/18/25               Functional outcome       Patient will demonstrate independence in home program for support of progression (Progressing)       Start:  02/18/25    Expected End:  05/18/25                LORETA SOLORZANO, PT

## 2025-03-31 ENCOUNTER — CLINICAL SUPPORT (OUTPATIENT)
Dept: REHABILITATION | Facility: HOSPITAL | Age: OVER 89
End: 2025-03-31
Payer: MEDICARE

## 2025-03-31 DIAGNOSIS — R29.6 FREQUENT FALLS: ICD-10-CM

## 2025-03-31 DIAGNOSIS — M19.90 OSTEOARTHRITIS, UNSPECIFIED OSTEOARTHRITIS TYPE, UNSPECIFIED SITE: Primary | ICD-10-CM

## 2025-03-31 PROBLEM — S01.21XA LACERATION OF NOSE: Status: ACTIVE | Noted: 2025-03-31

## 2025-03-31 PROBLEM — R53.1 WEAKNESS: Status: ACTIVE | Noted: 2024-06-01

## 2025-03-31 PROBLEM — S01.81XA LACERATION OF FOREHEAD: Status: ACTIVE | Noted: 2025-03-31
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
## 2025-03-31 PROCEDURE — 97116 GAIT TRAINING THERAPY: CPT

## 2025-03-31 PROCEDURE — 97110 THERAPEUTIC EXERCISES: CPT

## 2025-03-31 NOTE — PROGRESS NOTES
"  Outpatient Rehab    Physical Therapy Visit    Patient Name: Steve Castro  MRN: [id number]  YOB: 1932  Encounter Date: 3/31/2025    Therapy Diagnosis:   Encounter Diagnoses   Name Primary?    Osteoarthritis, unspecified osteoarthritis type, unspecified site Yes    Frequent falls      Physician: Janice Ponce DO    Physician Orders: Eval and Treat  Medical Diagnosis: Frequent falls    Visit # / Visits Authorized:  11 / 16  Insurance Authorization Period: 2/17/2025 to 1/31/2027  Date of Evaluation: 2/17/2025  Plan of Care Certification: 2/18/2025 to 5/18/2025     PT/PTA: PT   Number of PTA visits since last PT visit:0  Time In: 1503   Time Out: 1600  Total Time: 57   Total Billable Time: 55    FOTO:  Intake Score: 21%  Survey Score 1:  %  Survey Score 2:  %    Precautions     Standard, fall      Subjective   numbness in hands and feet, "they just don't want to move".  Pain reported as 0/10.      Objective            Treatment:  Therapeutic Exercise  TE 1: cubii 5 min  active assist  TE 2: seated hip abd red x 30  TE 3: seated marches red x 30  TE 4: seated knee flexion yellow 3x10  TE 5: seated knee extension- 1# x 30  TE 6: hip add squeeze x30  TE 7: ankle pf/df red x 30  TE 8: ankle ever/inver 2x10  Balance/Neuromuscular Re-Education  NMR 1: seated cone taps  Gait Training  GT 1: gait with RW 35', 58' min assist for steering    Time Entry(in minutes):  Gait Training Time Entry: 10  Neuromuscular Re-Education Time Entry: 5  Therapeutic Exercise Time Entry: 40    Assessment & Plan   Assessment: Patient was challenged today more than previous session with ankle ever/inver, needed activie assist and no resistance.  Gait essentially unchanged, demonstrating scissored steps, flexed posture, slow adriana.       Patient will continue to benefit from skilled outpatient physical therapy to address the deficits listed in the problem list box on initial evaluation, provide pt/family education and to maximize " pt's level of independence in the home and community environment.     Patient's spiritual, cultural, and educational needs considered and patient agreeable to plan of care and goals.           Plan: continue with current plan of care progressing towards established goals.    Goals:   Active       Ambulation/movement       Patient will walk with ' CGA (Progressing)       Start:  02/18/25    Expected End:  05/18/25               Changing body position       Patient will demonstrate ability to move from supine<>sit<>stand<>chair with SBA (Progressing)       Start:  02/18/25    Expected End:  05/18/25               Functional outcome       Patient will show a significant change in FOTO to  43 patient-reported outcome tool to demonstrate subjective improvement (Progressing)       Start:  02/18/25    Expected End:  05/18/25            Patient stated goal: get stronger, walk safely with less falling; patient will have no falls within PT treatment perior (Progressing)       Start:  02/18/25    Expected End:  05/18/25            Patient will demonstrate independence in home program for support of progression (Progressing)       Start:  02/18/25    Expected End:  05/18/25               Functional outcome       Patient will demonstrate independence in home program for support of progression (Progressing)       Start:  02/18/25    Expected End:  05/18/25                LORETA SOLORZANO, PT

## 2025-04-01 ENCOUNTER — TELEPHONE (OUTPATIENT)
Dept: EMERGENCY MEDICINE | Facility: HOSPITAL | Age: OVER 89
End: 2025-04-01
Payer: MEDICARE

## 2025-04-03 ENCOUNTER — PATIENT MESSAGE (OUTPATIENT)
Dept: FAMILY MEDICINE | Facility: CLINIC | Age: OVER 89
End: 2025-04-03
Payer: MEDICARE

## 2025-04-16 ENCOUNTER — CLINICAL SUPPORT (OUTPATIENT)
Dept: REHABILITATION | Facility: HOSPITAL | Age: OVER 89
End: 2025-04-16
Payer: MEDICARE

## 2025-04-16 ENCOUNTER — HOSPITAL ENCOUNTER (EMERGENCY)
Facility: HOSPITAL | Age: OVER 89
Discharge: HOME | End: 2025-04-16
Payer: MEDICARE

## 2025-04-16 VITALS
DIASTOLIC BLOOD PRESSURE: 62 MMHG | RESPIRATION RATE: 18 BRPM | SYSTOLIC BLOOD PRESSURE: 101 MMHG | WEIGHT: 120 LBS | HEIGHT: 65 IN | BODY MASS INDEX: 19.99 KG/M2 | HEART RATE: 80 BPM | TEMPERATURE: 98 F | OXYGEN SATURATION: 97 %

## 2025-04-16 DIAGNOSIS — Z48.02 VISIT FOR SUTURE REMOVAL: Primary | ICD-10-CM

## 2025-04-16 DIAGNOSIS — M19.90 OSTEOARTHRITIS, UNSPECIFIED OSTEOARTHRITIS TYPE, UNSPECIFIED SITE: Primary | ICD-10-CM

## 2025-04-16 DIAGNOSIS — R29.6 FREQUENT FALLS: ICD-10-CM

## 2025-04-16 PROCEDURE — 99999 HC NO LEVEL OF SERVICE - ED ONLY: CPT

## 2025-04-16 PROCEDURE — 97530 THERAPEUTIC ACTIVITIES: CPT

## 2025-04-16 PROCEDURE — 97116 GAIT TRAINING THERAPY: CPT

## 2025-04-16 NOTE — ED PROVIDER NOTES
Encounter Date: 4/16/2025       History     Chief Complaint   Patient presents with    Suture / Staple Removal     89 year old female presents to ED for suture removal. Daughter states she fell approximately 2 weeks ago and sustained lacerations to nose and forehead. Patient was evaluated in ED and had lacerations repaired. Daughter denies drainage, crusting, redness, erythema.     The history is provided by the patient and a relative.     Review of patient's allergies indicates:  No Known Allergies  Past Medical History:   Diagnosis Date    Chronic low back pain     Chronic pain of right knee     Mixed hyperlipidemia     Syncope     Vitamin D deficiency      History reviewed. No pertinent surgical history.  No family history on file.  Social History[1]  Review of Systems   Constitutional:  Negative for chills and fever.   Eyes:  Negative for photophobia and visual disturbance.   Respiratory:  Negative for cough and shortness of breath.    Cardiovascular:  Negative for chest pain and palpitations.   Gastrointestinal:  Negative for nausea and vomiting.   Skin:  Positive for wound. Negative for color change.   Hematological:  Negative for adenopathy. Does not bruise/bleed easily.   Psychiatric/Behavioral:  Negative for agitation and confusion.    All other systems reviewed and are negative.      Physical Exam     Initial Vitals   BP Pulse Resp Temp SpO2   -- -- -- -- --      MAP       --         Physical Exam    Nursing note and vitals reviewed.  Constitutional: She appears well-developed and well-nourished.   HENT:   Head: Normocephalic.       Sutures to left eyebrow and bridge of nose; sutures intact edges approximated. No drainage, crusting, erythema   Eyes: EOM are normal. Pupils are equal, round, and reactive to light.   Neck: Neck supple.   Normal range of motion.  Cardiovascular:  Normal rate and regular rhythm.           No murmur heard.  Pulmonary/Chest: She has no wheezes. She has no rhonchi.   Abdominal:  Abdomen is soft. She exhibits no distension. There is no abdominal tenderness.   Musculoskeletal:         General: No tenderness or edema.      Cervical back: Normal range of motion and neck supple.     Lymphadenopathy:     She has no cervical adenopathy.   Neurological: She is alert. No cranial nerve deficit or sensory deficit.   Skin: Skin is warm and dry. Capillary refill takes less than 2 seconds.   Psychiatric: She has a normal mood and affect. Thought content normal.         Medical Screening Exam   See Full Note    ED Course   Procedures  Labs Reviewed - No data to display       Imaging Results    None          Medications - No data to display  Medical Decision Making  89 year old female presents to ED for suture removal. Daughter states she fell approximately 2 weeks ago and sustained lacerations to nose and forehead. Patient was evaluated in ED and had lacerations repaired. Daughter denies drainage, crusting, redness, erythema.     Sutures removed                                        Clinical Impression:   Final diagnoses:  [Z48.02] Visit for suture removal (Primary)        ED Disposition Condition    Discharge Good          ED Prescriptions    None       Follow-up Information    None            [1]   Social History  Tobacco Use    Smoking status: Never    Smokeless tobacco: Never   Substance Use Topics    Alcohol use: Not Currently    Drug use: Never        Daniella Logan, LEXI  04/16/25 [id number]

## 2025-04-17 NOTE — PROGRESS NOTES
Outpatient Rehab    Physical Therapy Progress Note    Patient Name: Steve Castro  MRN: [id number]  YOB: 1932  Encounter Date: 4/16/2025    Therapy Diagnosis:   Encounter Diagnoses   Name Primary?    Osteoarthritis, unspecified osteoarthritis type, unspecified site Yes    Frequent falls      Physician: Janice Ponce DO    Physician Orders: Eval and Treat  Medical Diagnosis: Frequent falls    Visit # / Visits Authorized:  12 / 16  Insurance Authorization Period: 2/17/2025 to 1/31/2027  Date of Evaluation: 2/17/2025  Plan of Care Certification: 2/17/2025 to 5/18/2025     PT/PTA: PT   Number of PTA visits since last PT visit:0  Time In: 1505   Time Out: 1600  Total Time: 55   Total Billable Time: 45    FOTO:  Intake Score: 21%  Survey Score 1:  %  Survey Score 2:  %    Precautions     Standard, fall      Subjective   Patient/family report fall requiring stitches to left eyebrow and nose 2 weeks ago.  Numbness in right arm..  Family / care giver present for this visit:   Pain reported as 5/10. BLE, numbness in    Objective           Treatment:  Therapeutic Activity  TA 1: sit<>stand mod<>max assist  TA 2: static standing tolerance with walker  TA 3: wc mobility using BUE or BLE  Gait Training  GT 1: gait with RW 5' x 2.    Time Entry(in minutes):  Gait Training Time Entry: 15  Therapeutic Activity Time Entry: 30    Assessment & Plan   Assessment: Patient had a fall about 2 weeks ago, received stitches to left eyebrown and nose.  She is weaker now, requires mod<>max assist for sit<>stand with walker and ambulated <5 feet.  Recommended to granddaughter that she see a neurologist for further assessment.  Evaluation/Treatment Tolerance: Treatment limited secondary to medical complications (Comment)    Patient will continue to benefit from skilled outpatient physical therapy to address the deficits listed in the problem list box on initial evaluation, provide pt/family education and to maximize pt's level  of independence in the home and community environment.     Patient's spiritual, cultural, and educational needs considered and patient agreeable to plan of care and goals.           Plan: continue with current plan of care progressing towards established goals.    Goals:   Active       Ambulation/movement       Patient will walk with ' CGA (Progressing)       Start:  02/18/25    Expected End:  05/18/25               Changing body position       Patient will demonstrate ability to move from supine<>sit<>stand<>chair with SBA (Progressing)       Start:  02/18/25    Expected End:  05/18/25               Functional outcome       Patient will show a significant change in FOTO to  43 patient-reported outcome tool to demonstrate subjective improvement (Progressing)       Start:  02/18/25    Expected End:  05/18/25            Patient stated goal: get stronger, walk safely with less falling; patient will have no falls within PT treatment perior (Progressing)       Start:  02/18/25    Expected End:  05/18/25            Patient will demonstrate independence in home program for support of progression (Progressing)       Start:  02/18/25    Expected End:  05/18/25               Functional outcome       Patient will demonstrate independence in home program for support of progression (Progressing)       Start:  02/18/25    Expected End:  05/18/25                LORETA SOLORZANO, PT.

## 2025-05-01 ENCOUNTER — OFFICE VISIT (OUTPATIENT)
Dept: FAMILY MEDICINE | Facility: CLINIC | Age: OVER 89
End: 2025-05-01
Payer: MEDICARE

## 2025-05-01 VITALS
HEART RATE: 76 BPM | HEIGHT: 65 IN | BODY MASS INDEX: 19.99 KG/M2 | OXYGEN SATURATION: 96 % | RESPIRATION RATE: 19 BRPM | TEMPERATURE: 97 F | SYSTOLIC BLOOD PRESSURE: 105 MMHG | WEIGHT: 120 LBS | DIASTOLIC BLOOD PRESSURE: 70 MMHG
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
## 2025-05-01 DIAGNOSIS — M19.90 OSTEOARTHRITIS, UNSPECIFIED OSTEOARTHRITIS TYPE, UNSPECIFIED SITE: Primary | ICD-10-CM

## 2025-05-01 RX ORDER — DICLOFENAC SODIUM 75 MG/1
75 TABLET, DELAYED RELEASE ORAL 2 TIMES DAILY
Qty: 60 TABLET | Refills: 2 | Status: SHIPPED | OUTPATIENT
Start: 2025-05-01 | End: 2025-07-30

## 2025-05-01 NOTE — PROGRESS NOTES
Subjective:       Patient ID: Steve Castro is a 89 y.o. female.    Chief Complaint: Follow-up (X3 months & Hospital Follow Up. /Reports that she is losing strength in her hands, with her right hand she isn't able to use it since her last fall. )    Patient presents today for follow up on recent hospital emergency room visit. Patients granddaughter in room states patient has been falling more often in the home and was treated in the ED recently for a fall. Patient also complains of losing strength. Patient currently in Physical therapy. Patients granddaughter states its getting harder to get grandmother to therapy and out the house. Patient request help with taking medication and safety. Patient would benefit from home health assistance.        Current Medications[1]    Review of patient's allergies indicates:  No Known Allergies    Past Medical History:   Diagnosis Date    Chronic low back pain     Chronic pain of right knee     Mixed hyperlipidemia     Syncope     Vitamin D deficiency        History reviewed. No pertinent surgical history.    History reviewed. No pertinent family history.    Social History[2]    Review of Systems   Constitutional:  Negative for fatigue and fever.   HENT:  Negative for nasal congestion, hearing loss, mouth dryness, sinus pressure/congestion, sneezing and sore throat.    Eyes:  Negative for pain and discharge.   Respiratory:  Negative for cough and shortness of breath.    Cardiovascular:  Negative for chest pain and claudication.   Gastrointestinal:  Negative for abdominal distention, abdominal pain, anal bleeding, change in bowel habit, constipation and diarrhea.   Endocrine: Negative for cold intolerance and heat intolerance.   Genitourinary:  Negative for bladder incontinence, difficulty urinating, dyspareunia, dysuria, genital sores and hot flashes.   Musculoskeletal:  Negative for arthralgias, back pain, joint swelling, leg pain and joint deformity.   Integumentary:  Negative  "for rash and wound.   Allergic/Immunologic: Negative for environmental allergies, food allergies and immunocompromised state.   Neurological:  Positive for weakness. Negative for light-headedness, headaches and coordination difficulties.   Hematological: Negative.    Psychiatric/Behavioral: Negative.           Current Medications:   Medication List with Changes/Refills   New Medications    DICLOFENAC (VOLTAREN) 75 MG EC TABLET    Take 1 tablet (75 mg total) by mouth 2 (two) times daily.       Start Date: 5/1/2025  End Date: 7/30/2025   Current Medications    MULTIVITAMIN (THERAGRAN) PER TABLET    Take 1 tablet by mouth once daily.       Start Date: --        End Date: --   Discontinued Medications    ASPIRIN 81 MG CHEW    Take 1 tablet (81 mg total) by mouth once daily.       Start Date: 6/5/2024  End Date: 5/1/2025    DICLOFENAC SODIUM (VOLTAREN ARTHRITIS PAIN) 1 % GEL    Apply 2 g topically once daily.       Start Date: 1/30/2025 End Date: 5/1/2025    POLYETHYLENE GLYCOL (GLYCOLAX) 17 GRAM PWPK    Take 17 g by mouth 2 (two) times daily as needed for Constipation.       Start Date: 6/4/2024  End Date: 5/1/2025            Objective:        Vitals:    05/01/25 1003   BP: 105/70   BP Location: Left arm   Patient Position: Sitting   Pulse: 76   Resp: 19   Temp: 97.4 °F (36.3 °C)   TempSrc: Oral   SpO2: 96%   Weight: 54.4 kg (120 lb)   Height: 5' 5" (1.651 m)       Physical Exam  Constitutional:       Appearance: She is normal weight.   Cardiovascular:      Rate and Rhythm: Normal rate and regular rhythm.      Pulses: Normal pulses.      Heart sounds: Normal heart sounds.   Pulmonary:      Effort: Pulmonary effort is normal.      Breath sounds: Normal breath sounds.   Skin:     General: Skin is warm and dry.   Neurological:      Mental Status: She is alert.               Lab Results   Component Value Date    WBC 6.66 03/31/2025    HGB 11.9 (L) 03/31/2025    HCT 36.2 (L) 03/31/2025     03/31/2025    CHOL 158 " 06/02/2024    TRIG 73 06/02/2024    HDL 63 (H) 06/02/2024    ALT 17 03/31/2025    AST 48 (H) 03/31/2025     03/31/2025    K 4.8 03/31/2025     03/31/2025    CREATININE 1.01 03/31/2025    BUN 15 03/31/2025    CO2 22 (L) 03/31/2025    TSH 1.030 06/01/2024    INR 0.99 03/31/2025    HGBA1C 5.4 06/01/2024      Assessment:       1. Osteoarthritis, unspecified osteoarthritis type, unspecified site    2. Fall, initial encounter        Plan:         Problem List Items Addressed This Visit       Osteoarthritis - Primary    Patient still complaints of arthritic pain in hands. Patient has tried tylenol arthritis. And Aleeve with no success.  Patient request another medication today.    Patient prescribed Diclofenac 75 mg BID.  Patient educated to take medication with food and drink at least 64 ounces of water a day.          Relevant Medications    diclofenac (VOLTAREN) 75 MG EC tablet    Other Relevant Orders    Ambulatory referral/consult to Home Health    Fall    Patient presents today for follow up on recent hospital emergency room visit. Patients granddaughter in room states patient has been falling more often in the home and was treated in the ED recently for a fall. Patient also complains of losing strength. Patient currently in Physical therapy. Patients granddaughter states its getting harder to get grandmother to therapy and out the house. Patient request help with taking medication and safety. Patient would benefit from home health assistance    Will refer to home health         Relevant Orders    Ambulatory referral/consult to Home Health         Follow up in about 3 months (around 8/1/2025), or Medication and follow up.    Star Mensah MD     Instructed patient that if symptoms fail to improve or worsen patient should seek immediate medical attention or report to the nearest emergency department. Patient expressed verbal agreement and understanding to this plan of care.           [1]   Current  Outpatient Medications:     multivitamin (THERAGRAN) per tablet, Take 1 tablet by mouth once daily., Disp: , Rfl:     diclofenac (VOLTAREN) 75 MG EC tablet, Take 1 tablet (75 mg total) by mouth 2 (two) times daily., Disp: 60 tablet, Rfl: 2  [2]   Social History  Tobacco Use    Smoking status: Never    Smokeless tobacco: Never   Substance Use Topics    Alcohol use: Not Currently    Drug use: Never

## 2025-05-01 NOTE — ASSESSMENT & PLAN NOTE
Patient presents today for follow up on recent hospital emergency room visit. Patients granddaughter in room states patient has been falling more often in the home and was treated in the ED recently for a fall. Patient also complains of losing strength. Patient currently in Physical therapy. Patients granddaughter states its getting harder to get grandmother to therapy and out the house. Patient request help with taking medication and safety. Patient would benefit from home health assistance    Will refer to home health

## 2025-05-01 NOTE — ASSESSMENT & PLAN NOTE
Patient still complaints of arthritic pain in hands. Patient has tried tylenol arthritis. And Aleeve with no success.  Patient request another medication today.    Patient prescribed Diclofenac 75 mg BID.  Patient educated to take medication with food and drink at least 64 ounces of water a day.

## 2025-08-04 ENCOUNTER — TELEPHONE (OUTPATIENT)
Dept: FAMILY MEDICINE | Facility: CLINIC | Age: OVER 89
End: 2025-08-04
Payer: MEDICARE

## 2025-08-11 ENCOUNTER — HOSPITAL ENCOUNTER (INPATIENT)
Facility: HOSPITAL | Age: OVER 89
LOS: 9 days | Discharge: NURSING, REHAB OR LONG-TERM CARE | DRG: 871 | End: 2025-08-20
Attending: EMERGENCY MEDICINE | Admitting: HOSPITALIST
Payer: MEDICARE

## 2025-08-11 ENCOUNTER — OFFICE VISIT (OUTPATIENT)
Dept: FAMILY MEDICINE | Facility: CLINIC | Age: OVER 89
End: 2025-08-11
Payer: MEDICARE

## 2025-08-11 VITALS
OXYGEN SATURATION: 97 % | TEMPERATURE: 98 F | DIASTOLIC BLOOD PRESSURE: 54 MMHG | RESPIRATION RATE: 18 BRPM | SYSTOLIC BLOOD PRESSURE: 96 MMHG | HEART RATE: 112 BPM

## 2025-08-11 DIAGNOSIS — N17.9 SEPSIS WITH ACUTE RENAL FAILURE AND SEPTIC SHOCK, DUE TO UNSPECIFIED ORGANISM, UNSPECIFIED ACUTE RENAL FAILURE TYPE: ICD-10-CM

## 2025-08-11 DIAGNOSIS — E87.0 DEHYDRATION WITH HYPERNATREMIA: ICD-10-CM

## 2025-08-11 DIAGNOSIS — R65.21 SEPTIC SHOCK: ICD-10-CM

## 2025-08-11 DIAGNOSIS — N39.0 URINARY TRACT INFECTION WITH HEMATURIA, SITE UNSPECIFIED: ICD-10-CM

## 2025-08-11 DIAGNOSIS — A41.9 SEPTIC SHOCK: ICD-10-CM

## 2025-08-11 DIAGNOSIS — N30.00 ACUTE CYSTITIS WITHOUT HEMATURIA: ICD-10-CM

## 2025-08-11 DIAGNOSIS — R65.21 SEPSIS WITH ACUTE RENAL FAILURE AND SEPTIC SHOCK, DUE TO UNSPECIFIED ORGANISM, UNSPECIFIED ACUTE RENAL FAILURE TYPE: ICD-10-CM

## 2025-08-11 DIAGNOSIS — E87.0 HYPERNATREMIA: Primary | ICD-10-CM

## 2025-08-11 DIAGNOSIS — A41.9 SEPSIS, DUE TO UNSPECIFIED ORGANISM, UNSPECIFIED WHETHER ACUTE ORGAN DYSFUNCTION PRESENT: ICD-10-CM

## 2025-08-11 DIAGNOSIS — I50.22 CHRONIC SYSTOLIC CONGESTIVE HEART FAILURE DUE TO VALVULAR DISEASE: ICD-10-CM

## 2025-08-11 DIAGNOSIS — A41.9 SEPSIS: ICD-10-CM

## 2025-08-11 DIAGNOSIS — R62.7 FAILURE TO THRIVE IN ADULT: ICD-10-CM

## 2025-08-11 DIAGNOSIS — N39.0 E. COLI UTI: ICD-10-CM

## 2025-08-11 DIAGNOSIS — I38 CHRONIC SYSTOLIC CONGESTIVE HEART FAILURE DUE TO VALVULAR DISEASE: ICD-10-CM

## 2025-08-11 DIAGNOSIS — E87.5 HYPERKALEMIA: ICD-10-CM

## 2025-08-11 DIAGNOSIS — R79.89 ELEVATED TROPONIN: ICD-10-CM

## 2025-08-11 DIAGNOSIS — Z13.6 SCREENING FOR CARDIOVASCULAR CONDITION: ICD-10-CM

## 2025-08-11 DIAGNOSIS — K85.00 IDIOPATHIC ACUTE PANCREATITIS WITHOUT INFECTION OR NECROSIS: ICD-10-CM

## 2025-08-11 DIAGNOSIS — R62.7 FAILURE TO THRIVE IN ADULT: Primary | ICD-10-CM

## 2025-08-11 DIAGNOSIS — I35.1 AORTIC VALVE INSUFFICIENCY, ETIOLOGY OF CARDIAC VALVE DISEASE UNSPECIFIED: ICD-10-CM

## 2025-08-11 DIAGNOSIS — R31.9 URINARY TRACT INFECTION WITH HEMATURIA, SITE UNSPECIFIED: ICD-10-CM

## 2025-08-11 DIAGNOSIS — N17.9 AKI (ACUTE KIDNEY INJURY): ICD-10-CM

## 2025-08-11 DIAGNOSIS — I21.4 NSTEMI (NON-ST ELEVATED MYOCARDIAL INFARCTION): ICD-10-CM

## 2025-08-11 DIAGNOSIS — B96.20 E. COLI UTI: ICD-10-CM

## 2025-08-11 DIAGNOSIS — A41.9 SEPSIS WITH ACUTE RENAL FAILURE AND SEPTIC SHOCK, DUE TO UNSPECIFIED ORGANISM, UNSPECIFIED ACUTE RENAL FAILURE TYPE: ICD-10-CM

## 2025-08-11 DIAGNOSIS — E86.0 DEHYDRATION WITH HYPERNATREMIA: ICD-10-CM

## 2025-08-11 DIAGNOSIS — E78.2 MIXED HYPERLIPIDEMIA: ICD-10-CM

## 2025-08-11 PROBLEM — R65.20 SEPSIS WITH ACUTE ORGAN DYSFUNCTION: Status: ACTIVE | Noted: 2025-08-11

## 2025-08-11 LAB
25(OH)D3 SERPL-MCNC: 39.2 NG/ML (ref 30–80)
ALBUMIN SERPL BCP-MCNC: 1.9 G/DL (ref 3.4–4.8)
ALBUMIN/GLOB SERPL: 0.3 {RATIO}
ALP SERPL-CCNC: 123 U/L (ref 40–150)
ALT SERPL W P-5'-P-CCNC: 77 U/L
ANION GAP SERPL CALCULATED.3IONS-SCNC: 17 MMOL/L (ref 7–16)
APTT PPP: 27.7 SECONDS (ref 25.2–37.3)
AST SERPL W P-5'-P-CCNC: 81 U/L (ref 11–45)
BACTERIA #/AREA URNS HPF: ABNORMAL /HPF
BASOPHILS # BLD AUTO: 0.05 K/UL (ref 0–0.2)
BASOPHILS NFR BLD AUTO: 0.3 % (ref 0–1)
BILIRUB SERPL-MCNC: 1.5 MG/DL
BILIRUB UR QL STRIP: NEGATIVE
BUN SERPL-MCNC: 92 MG/DL (ref 10–20)
BUN/CREAT SERPL: 37 (ref 6–20)
CALCIUM SERPL-MCNC: 9 MG/DL (ref 8.4–10.2)
CHLORIDE SERPL-SCNC: 119 MMOL/L (ref 98–111)
CLARITY UR: ABNORMAL
CO2 SERPL-SCNC: 16 MMOL/L (ref 23–31)
COLOR UR: ABNORMAL
CREAT SERPL-MCNC: 2.46 MG/DL (ref 0.55–1.02)
DIFFERENTIAL METHOD BLD: ABNORMAL
EGFR (NO RACE VARIABLE) (RUSH/TITUS): 18 ML/MIN/1.73M2
EOSINOPHIL # BLD AUTO: 0.01 K/UL (ref 0–0.5)
EOSINOPHIL NFR BLD AUTO: 0.1 % (ref 1–4)
ERYTHROCYTE [DISTWIDTH] IN BLOOD BY AUTOMATED COUNT: 15.7 % (ref 11.5–14.5)
ERYTHROCYTE [SEDIMENTATION RATE] IN BLOOD BY WESTERGREN METHOD: 54 MM/HR (ref 0–42)
GLOBULIN SER-MCNC: 6.4 G/DL (ref 2–4)
GLUCOSE SERPL-MCNC: 111 MG/DL (ref 75–121)
GLUCOSE UR STRIP-MCNC: NORMAL MG/DL
HAV IGM SER QL: NORMAL
HBV CORE IGM SER QL: NORMAL
HBV SURFACE AG SERPL QL IA: NORMAL
HCT VFR BLD AUTO: 31.1 % (ref 38–47)
HCV AB SER QL: NORMAL
HGB BLD-MCNC: 10.2 G/DL (ref 12–16)
HYPOCHROMIA BLD QL SMEAR: ABNORMAL
IMM GRANULOCYTES # BLD AUTO: 0.12 K/UL (ref 0–0.04)
IMM GRANULOCYTES NFR BLD: 0.8 % (ref 0–0.4)
INFLUENZA A MOLECULAR (OHS): NEGATIVE
INFLUENZA B MOLECULAR (OHS): NEGATIVE
INR BLD: 1.43
KETONES UR STRIP-SCNC: NEGATIVE MG/DL
LACTATE SERPL-SCNC: 1.4 MMOL/L (ref 0.5–2.2)
LACTATE SERPL-SCNC: 2.6 MMOL/L (ref 0.5–2.2)
LEUKOCYTE ESTERASE UR QL STRIP: ABNORMAL
LYMPHOCYTES # BLD AUTO: 3.27 K/UL (ref 1–4.8)
LYMPHOCYTES NFR BLD AUTO: 22.3 % (ref 27–41)
LYMPHOCYTES NFR BLD MANUAL: 10 % (ref 27–41)
MAGNESIUM SERPL-MCNC: 3.2 MG/DL (ref 1.6–2.6)
MCH RBC QN AUTO: 26.5 PG (ref 27–31)
MCHC RBC AUTO-ENTMCNC: 32.8 G/DL (ref 32–36)
MCV RBC AUTO: 80.8 FL (ref 80–96)
MONOCYTES # BLD AUTO: 1.04 K/UL (ref 0–0.8)
MONOCYTES NFR BLD AUTO: 7.1 % (ref 2–6)
MONOCYTES NFR BLD MANUAL: 4 % (ref 2–6)
MPC BLD CALC-MCNC: 10.5 FL (ref 9.4–12.4)
NEUTROPHILS # BLD AUTO: 10.19 K/UL (ref 1.8–7.7)
NEUTROPHILS NFR BLD AUTO: 69.4 % (ref 53–65)
NEUTS SEG NFR BLD MANUAL: 86 % (ref 50–62)
NITRITE UR QL STRIP: NEGATIVE
NRBC # BLD AUTO: 0 X10E3/UL
NRBC, AUTO (.00): 0 %
PH UR STRIP: 5.5 PH UNITS
PLATELET # BLD AUTO: 170 K/UL (ref 150–400)
PLATELET MORPHOLOGY: NORMAL
POTASSIUM SERPL-SCNC: 5.9 MMOL/L (ref 3.5–5.1)
PREALB SERPL NEPH-MCNC: 3 MG/DL (ref 14–37)
PROT SERPL-MCNC: 8.3 G/DL (ref 5.8–7.6)
PROT UR QL STRIP: 100
PROTHROMBIN TIME: 17.3 SECONDS (ref 11.7–14.7)
RBC # BLD AUTO: 3.85 M/UL (ref 4.2–5.4)
RBC # UR STRIP: ABNORMAL /UL
RBC #/AREA URNS HPF: 77 /HPF
SARS-COV-2 RDRP RESP QL NAA+PROBE: NEGATIVE
SODIUM SERPL-SCNC: 146 MMOL/L (ref 136–145)
SP GR UR STRIP: 1.01
TROPONIN I SERPL HS-MCNC: 496.4 NG/L
TSH SERPL DL<=0.005 MIU/L-ACNC: 0.6 UIU/ML (ref 0.35–4.94)
UROBILINOGEN UR STRIP-ACNC: NORMAL MG/DL
VIT B12 SERPL-MCNC: >2000 PG/ML (ref 213–816)
WBC # BLD AUTO: 14.68 K/UL (ref 4.5–11)
WBC #/AREA URNS HPF: >182 /HPF
WBC CLUMPS, UA: ABNORMAL /HPF

## 2025-08-11 PROCEDURE — 96366 THER/PROPH/DIAG IV INF ADDON: CPT

## 2025-08-11 PROCEDURE — 83605 ASSAY OF LACTIC ACID: CPT | Performed by: HOSPITALIST

## 2025-08-11 PROCEDURE — 84484 ASSAY OF TROPONIN QUANT: CPT | Performed by: EMERGENCY MEDICINE

## 2025-08-11 PROCEDURE — 96365 THER/PROPH/DIAG IV INF INIT: CPT

## 2025-08-11 PROCEDURE — 63600175 PHARM REV CODE 636 W HCPCS: Performed by: EMERGENCY MEDICINE

## 2025-08-11 PROCEDURE — 99223 1ST HOSP IP/OBS HIGH 75: CPT | Mod: ,,, | Performed by: HOSPITALIST

## 2025-08-11 PROCEDURE — 83735 ASSAY OF MAGNESIUM: CPT | Performed by: EMERGENCY MEDICINE

## 2025-08-11 PROCEDURE — 93005 ELECTROCARDIOGRAM TRACING: CPT

## 2025-08-11 PROCEDURE — 63600175 PHARM REV CODE 636 W HCPCS: Performed by: HOSPITALIST

## 2025-08-11 PROCEDURE — 81001 URINALYSIS AUTO W/SCOPE: CPT | Performed by: EMERGENCY MEDICINE

## 2025-08-11 PROCEDURE — 87040 BLOOD CULTURE FOR BACTERIA: CPT | Performed by: EMERGENCY MEDICINE

## 2025-08-11 PROCEDURE — 87086 URINE CULTURE/COLONY COUNT: CPT | Performed by: EMERGENCY MEDICINE

## 2025-08-11 PROCEDURE — 87635 SARS-COV-2 COVID-19 AMP PRB: CPT | Performed by: EMERGENCY MEDICINE

## 2025-08-11 PROCEDURE — 80074 ACUTE HEPATITIS PANEL: CPT | Performed by: HOSPITALIST

## 2025-08-11 PROCEDURE — 36415 COLL VENOUS BLD VENIPUNCTURE: CPT | Performed by: EMERGENCY MEDICINE

## 2025-08-11 PROCEDURE — 82607 VITAMIN B-12: CPT | Performed by: HOSPITALIST

## 2025-08-11 PROCEDURE — 85025 COMPLETE CBC W/AUTO DIFF WBC: CPT | Performed by: EMERGENCY MEDICINE

## 2025-08-11 PROCEDURE — 80053 COMPREHEN METABOLIC PANEL: CPT | Performed by: EMERGENCY MEDICINE

## 2025-08-11 PROCEDURE — 25000003 PHARM REV CODE 250: Performed by: HOSPITALIST

## 2025-08-11 PROCEDURE — 84443 ASSAY THYROID STIM HORMONE: CPT | Performed by: HOSPITALIST

## 2025-08-11 PROCEDURE — 25000003 PHARM REV CODE 250: Performed by: EMERGENCY MEDICINE

## 2025-08-11 PROCEDURE — 96361 HYDRATE IV INFUSION ADD-ON: CPT

## 2025-08-11 PROCEDURE — 82306 VITAMIN D 25 HYDROXY: CPT | Performed by: HOSPITALIST

## 2025-08-11 PROCEDURE — 85651 RBC SED RATE NONAUTOMATED: CPT | Performed by: HOSPITALIST

## 2025-08-11 PROCEDURE — 96367 TX/PROPH/DG ADDL SEQ IV INF: CPT

## 2025-08-11 PROCEDURE — 87502 INFLUENZA DNA AMP PROBE: CPT | Performed by: EMERGENCY MEDICINE

## 2025-08-11 PROCEDURE — 11000001 HC ACUTE MED/SURG PRIVATE ROOM

## 2025-08-11 PROCEDURE — 83605 ASSAY OF LACTIC ACID: CPT | Performed by: EMERGENCY MEDICINE

## 2025-08-11 PROCEDURE — 84134 ASSAY OF PREALBUMIN: CPT | Performed by: HOSPITALIST

## 2025-08-11 PROCEDURE — 85610 PROTHROMBIN TIME: CPT | Performed by: HOSPITALIST

## 2025-08-11 RX ORDER — ENOXAPARIN SODIUM 100 MG/ML
30 INJECTION SUBCUTANEOUS EVERY 24 HOURS
Status: DISCONTINUED | OUTPATIENT
Start: 2025-08-11 | End: 2025-08-17

## 2025-08-11 RX ORDER — IBUPROFEN 200 MG
16 TABLET ORAL
Status: DISCONTINUED | OUTPATIENT
Start: 2025-08-11 | End: 2025-08-20 | Stop reason: HOSPADM

## 2025-08-11 RX ORDER — GLUCAGON 1 MG
1 KIT INJECTION
Status: DISCONTINUED | OUTPATIENT
Start: 2025-08-11 | End: 2025-08-12

## 2025-08-11 RX ORDER — DEXTROSE MONOHYDRATE 50 MG/ML
INJECTION, SOLUTION INTRAVENOUS CONTINUOUS
Status: DISCONTINUED | OUTPATIENT
Start: 2025-08-11 | End: 2025-08-12

## 2025-08-11 RX ORDER — CEFTRIAXONE 1 G/1
1 INJECTION, POWDER, FOR SOLUTION INTRAMUSCULAR; INTRAVENOUS
Status: DISCONTINUED | OUTPATIENT
Start: 2025-08-12 | End: 2025-08-12

## 2025-08-11 RX ORDER — IBUPROFEN 200 MG
24 TABLET ORAL
Status: DISCONTINUED | OUTPATIENT
Start: 2025-08-11 | End: 2025-08-20 | Stop reason: HOSPADM

## 2025-08-11 RX ADMIN — VANCOMYCIN HYDROCHLORIDE 1250 MG: 500 INJECTION, POWDER, LYOPHILIZED, FOR SOLUTION INTRAVENOUS at 07:08

## 2025-08-11 RX ADMIN — DEXTROSE MONOHYDRATE: 50 INJECTION, SOLUTION INTRAVENOUS at 11:08

## 2025-08-11 RX ADMIN — ENOXAPARIN SODIUM 30 MG: 30 INJECTION SUBCUTANEOUS at 10:08

## 2025-08-11 RX ADMIN — SODIUM CHLORIDE 1000 ML: 9 INJECTION, SOLUTION INTRAVENOUS at 10:08

## 2025-08-11 RX ADMIN — PIPERACILLIN SODIUM AND TAZOBACTAM SODIUM 4.5 G: 4; .5 INJECTION, POWDER, FOR SOLUTION INTRAVENOUS at 10:08

## 2025-08-11 RX ADMIN — SODIUM CHLORIDE 1632 ML: 9 INJECTION, SOLUTION INTRAVENOUS at 05:08

## 2025-08-11 RX ADMIN — PIPERACILLIN SODIUM AND TAZOBACTAM SODIUM 4.5 G: 4; .5 INJECTION, POWDER, FOR SOLUTION INTRAVENOUS at 06:08

## 2025-08-12 PROBLEM — E87.5 HYPERKALEMIA: Status: ACTIVE | Noted: 2025-08-12

## 2025-08-12 PROBLEM — I35.1 AORTIC VALVE REGURGITATION: Status: RESOLVED | Noted: 2025-08-12 | Resolved: 2025-08-12

## 2025-08-12 PROBLEM — I21.4 NSTEMI (NON-ST ELEVATED MYOCARDIAL INFARCTION): Status: ACTIVE | Noted: 2025-08-12

## 2025-08-12 PROBLEM — R65.21 SEPTIC SHOCK: Status: ACTIVE | Noted: 2025-08-11

## 2025-08-12 PROBLEM — E87.5 HYPERKALEMIA: Status: RESOLVED | Noted: 2025-08-12 | Resolved: 2025-08-12

## 2025-08-12 PROBLEM — N30.00 ACUTE CYSTITIS WITHOUT HEMATURIA: Status: ACTIVE | Noted: 2025-08-12

## 2025-08-12 PROBLEM — I35.1 AORTIC VALVE REGURGITATION: Status: ACTIVE | Noted: 2025-08-12

## 2025-08-12 LAB
ANION GAP SERPL CALCULATED.3IONS-SCNC: 8 MMOL/L (ref 7–16)
ANISOCYTOSIS BLD QL SMEAR: ABNORMAL
BASOPHILS # BLD AUTO: 0.06 K/UL (ref 0–0.2)
BASOPHILS NFR BLD AUTO: 0.4 % (ref 0–1)
BUN SERPL-MCNC: 77 MG/DL (ref 10–20)
BUN/CREAT SERPL: 40 (ref 6–20)
CALCIUM SERPL-MCNC: 7.6 MG/DL (ref 8.4–10.2)
CHLORIDE SERPL-SCNC: 119 MMOL/L (ref 98–111)
CO2 SERPL-SCNC: 17 MMOL/L (ref 23–31)
CORTIS AM PEAK SERPL-MCNC: 11.6 ΜG/DL (ref 4.5–22.7)
CREAT SERPL-MCNC: 1.93 MG/DL (ref 0.55–1.02)
DIFFERENTIAL METHOD BLD: ABNORMAL
EGFR (NO RACE VARIABLE) (RUSH/TITUS): 24 ML/MIN/1.73M2
EOSINOPHIL # BLD AUTO: 0.07 K/UL (ref 0–0.5)
EOSINOPHIL NFR BLD AUTO: 0.5 % (ref 1–4)
EOSINOPHIL NFR BLD MANUAL: 1 % (ref 1–4)
ERYTHROCYTE [DISTWIDTH] IN BLOOD BY AUTOMATED COUNT: 15.5 % (ref 11.5–14.5)
GLUCOSE SERPL-MCNC: 153 MG/DL (ref 70–105)
GLUCOSE SERPL-MCNC: 185 MG/DL (ref 70–105)
GLUCOSE SERPL-MCNC: 193 MG/DL (ref 75–121)
GLUCOSE SERPL-MCNC: 203 MG/DL (ref 70–105)
HCT VFR BLD AUTO: 26.7 % (ref 38–47)
HGB BLD-MCNC: 9 G/DL (ref 12–16)
IMM GRANULOCYTES # BLD AUTO: 0.32 K/UL (ref 0–0.04)
IMM GRANULOCYTES NFR BLD: 2.4 % (ref 0–0.4)
LYMPHOCYTES # BLD AUTO: 3.77 K/UL (ref 1–4.8)
LYMPHOCYTES NFR BLD AUTO: 27.8 % (ref 27–41)
LYMPHOCYTES NFR BLD MANUAL: 16 % (ref 27–41)
MCH RBC QN AUTO: 26.9 PG (ref 27–31)
MCHC RBC AUTO-ENTMCNC: 33.7 G/DL (ref 32–36)
MCV RBC AUTO: 79.9 FL (ref 80–96)
MONOCYTES # BLD AUTO: 1 K/UL (ref 0–0.8)
MONOCYTES NFR BLD AUTO: 7.4 % (ref 2–6)
MONOCYTES NFR BLD MANUAL: 4 % (ref 2–6)
MPC BLD CALC-MCNC: 10.1 FL (ref 9.4–12.4)
NEUTROPHILS # BLD AUTO: 8.36 K/UL (ref 1.8–7.7)
NEUTROPHILS NFR BLD AUTO: 61.5 % (ref 53–65)
NEUTS SEG NFR BLD MANUAL: 79 % (ref 50–62)
NRBC # BLD AUTO: 0.02 X10E3/UL
NRBC, AUTO (.00): 0.1 %
PLATELET # BLD AUTO: 132 K/UL (ref 150–400)
PLATELET MORPHOLOGY: ABNORMAL
POTASSIUM SERPL-SCNC: 4.1 MMOL/L (ref 3.5–5.1)
RBC # BLD AUTO: 3.34 M/UL (ref 4.2–5.4)
SODIUM SERPL-SCNC: 140 MMOL/L (ref 136–145)
TROPONIN I SERPL HS-MCNC: 143.7 NG/L
TROPONIN I SERPL HS-MCNC: 260.6 NG/L
WBC # BLD AUTO: 13.58 K/UL (ref 4.5–11)

## 2025-08-12 PROCEDURE — 84484 ASSAY OF TROPONIN QUANT: CPT | Performed by: HOSPITALIST

## 2025-08-12 PROCEDURE — 36415 COLL VENOUS BLD VENIPUNCTURE: CPT

## 2025-08-12 PROCEDURE — 99291 CRITICAL CARE FIRST HOUR: CPT

## 2025-08-12 PROCEDURE — 25000003 PHARM REV CODE 250: Performed by: HOSPITALIST

## 2025-08-12 PROCEDURE — 63600175 PHARM REV CODE 636 W HCPCS: Performed by: STUDENT IN AN ORGANIZED HEALTH CARE EDUCATION/TRAINING PROGRAM

## 2025-08-12 PROCEDURE — 82962 GLUCOSE BLOOD TEST: CPT

## 2025-08-12 PROCEDURE — 96368 THER/DIAG CONCURRENT INF: CPT

## 2025-08-12 PROCEDURE — 36415 COLL VENOUS BLD VENIPUNCTURE: CPT | Performed by: HOSPITALIST

## 2025-08-12 PROCEDURE — 96375 TX/PRO/DX INJ NEW DRUG ADDON: CPT

## 2025-08-12 PROCEDURE — 99233 SBSQ HOSP IP/OBS HIGH 50: CPT | Mod: ,,, | Performed by: HOSPITALIST

## 2025-08-12 PROCEDURE — 84484 ASSAY OF TROPONIN QUANT: CPT

## 2025-08-12 PROCEDURE — 83880 ASSAY OF NATRIURETIC PEPTIDE: CPT | Performed by: STUDENT IN AN ORGANIZED HEALTH CARE EDUCATION/TRAINING PROGRAM

## 2025-08-12 PROCEDURE — 82533 TOTAL CORTISOL: CPT | Performed by: HOSPITALIST

## 2025-08-12 PROCEDURE — 63600175 PHARM REV CODE 636 W HCPCS: Performed by: HOSPITALIST

## 2025-08-12 PROCEDURE — 99291 CRITICAL CARE FIRST HOUR: CPT | Mod: ,,, | Performed by: STUDENT IN AN ORGANIZED HEALTH CARE EDUCATION/TRAINING PROGRAM

## 2025-08-12 PROCEDURE — 80048 BASIC METABOLIC PNL TOTAL CA: CPT | Performed by: HOSPITALIST

## 2025-08-12 PROCEDURE — 25000003 PHARM REV CODE 250: Performed by: STUDENT IN AN ORGANIZED HEALTH CARE EDUCATION/TRAINING PROGRAM

## 2025-08-12 PROCEDURE — 20000000 HC ICU ROOM

## 2025-08-12 PROCEDURE — 85025 COMPLETE CBC W/AUTO DIFF WBC: CPT | Performed by: HOSPITALIST

## 2025-08-12 RX ORDER — GLUCAGON 1 MG
1 KIT INJECTION
Status: DISCONTINUED | OUTPATIENT
Start: 2025-08-12 | End: 2025-08-14

## 2025-08-12 RX ORDER — SODIUM CHLORIDE 0.9 % (FLUSH) 0.9 %
10 SYRINGE (ML) INJECTION
Status: DISCONTINUED | OUTPATIENT
Start: 2025-08-12 | End: 2025-08-20 | Stop reason: HOSPADM

## 2025-08-12 RX ORDER — CEFTRIAXONE 1 G/1
1 INJECTION, POWDER, FOR SOLUTION INTRAMUSCULAR; INTRAVENOUS
Status: DISCONTINUED | OUTPATIENT
Start: 2025-08-12 | End: 2025-08-12

## 2025-08-12 RX ORDER — MUPIROCIN 20 MG/G
OINTMENT TOPICAL 2 TIMES DAILY
Status: COMPLETED | OUTPATIENT
Start: 2025-08-12 | End: 2025-08-17

## 2025-08-12 RX ORDER — INSULIN ASPART 100 [IU]/ML
0-5 INJECTION, SOLUTION INTRAVENOUS; SUBCUTANEOUS EVERY 6 HOURS PRN
Status: DISCONTINUED | OUTPATIENT
Start: 2025-08-12 | End: 2025-08-14

## 2025-08-12 RX ORDER — NOREPINEPHRINE BITARTRATE/D5W 4MG/250ML
0-.2 PLASTIC BAG, INJECTION (ML) INTRAVENOUS CONTINUOUS
Status: DISPENSED | OUTPATIENT
Start: 2025-08-12 | End: 2025-08-13

## 2025-08-12 RX ORDER — DOPAMINE HYDROCHLORIDE 160 MG/100ML
10 INJECTION, SOLUTION INTRAVENOUS CONTINUOUS
Status: DISCONTINUED | OUTPATIENT
Start: 2025-08-12 | End: 2025-08-12

## 2025-08-12 RX ORDER — ONDANSETRON HYDROCHLORIDE 2 MG/ML
4 INJECTION, SOLUTION INTRAVENOUS EVERY 8 HOURS PRN
Status: DISCONTINUED | OUTPATIENT
Start: 2025-08-12 | End: 2025-08-20 | Stop reason: HOSPADM

## 2025-08-12 RX ORDER — ACETAMINOPHEN 325 MG/1
650 TABLET ORAL EVERY 4 HOURS PRN
Status: DISCONTINUED | OUTPATIENT
Start: 2025-08-12 | End: 2025-08-20 | Stop reason: HOSPADM

## 2025-08-12 RX ADMIN — CEFTRIAXONE SODIUM 1 G: 1 INJECTION, POWDER, FOR SOLUTION INTRAMUSCULAR; INTRAVENOUS at 08:08

## 2025-08-12 RX ADMIN — PIPERACILLIN SODIUM AND TAZOBACTAM SODIUM 4.5 G: 4; .5 INJECTION, POWDER, FOR SOLUTION INTRAVENOUS at 03:08

## 2025-08-12 RX ADMIN — DOPAMINE HYDROCHLORIDE 10 MCG/KG/MIN: 160 INJECTION, SOLUTION INTRAVENOUS at 05:08

## 2025-08-12 RX ADMIN — MUPIROCIN: 20 OINTMENT TOPICAL at 08:08

## 2025-08-12 RX ADMIN — ENOXAPARIN SODIUM 30 MG: 30 INJECTION SUBCUTANEOUS at 04:08

## 2025-08-12 RX ADMIN — SODIUM CHLORIDE, POTASSIUM CHLORIDE, SODIUM LACTATE AND CALCIUM CHLORIDE 1000 ML: 600; 310; 30; 20 INJECTION, SOLUTION INTRAVENOUS at 04:08

## 2025-08-12 RX ADMIN — DEXTROSE MONOHYDRATE: 50 INJECTION, SOLUTION INTRAVENOUS at 05:08

## 2025-08-12 RX ADMIN — NOREPINEPHRINE BITARTRATE 0.02 MCG/KG/MIN: 4 INJECTION, SOLUTION INTRAVENOUS at 03:08

## 2025-08-12 RX ADMIN — DEXTROSE MONOHYDRATE: 50 INJECTION, SOLUTION INTRAVENOUS at 10:08

## 2025-08-13 PROBLEM — N39.0 E. COLI UTI: Status: ACTIVE | Noted: 2025-08-12

## 2025-08-13 PROBLEM — E87.0 DEHYDRATION WITH HYPERNATREMIA: Status: RESOLVED | Noted: 2025-08-11 | Resolved: 2025-08-13

## 2025-08-13 PROBLEM — E86.0 DEHYDRATION WITH HYPERNATREMIA: Status: RESOLVED | Noted: 2025-08-11 | Resolved: 2025-08-13

## 2025-08-13 PROBLEM — K85.00 IDIOPATHIC ACUTE PANCREATITIS WITHOUT INFECTION OR NECROSIS: Status: ACTIVE | Noted: 2025-08-13

## 2025-08-13 PROBLEM — B96.20 E. COLI UTI: Status: ACTIVE | Noted: 2025-08-12

## 2025-08-13 PROBLEM — N30.00 ACUTE CYSTITIS WITHOUT HEMATURIA: Status: ACTIVE | Noted: 2025-08-13

## 2025-08-13 LAB
AMYLASE SERPL-CCNC: 795 U/L (ref 20–160)
ANION GAP SERPL CALCULATED.3IONS-SCNC: 10 MMOL/L (ref 7–16)
BASOPHILS # BLD AUTO: 0.04 K/UL (ref 0–0.2)
BASOPHILS NFR BLD AUTO: 0.3 % (ref 0–1)
BUN SERPL-MCNC: 54 MG/DL (ref 10–20)
BUN/CREAT SERPL: 32 (ref 6–20)
CALCIUM SERPL-MCNC: 8.1 MG/DL (ref 8.4–10.2)
CHLORIDE SERPL-SCNC: 116 MMOL/L (ref 98–111)
CO2 SERPL-SCNC: 15 MMOL/L (ref 23–31)
CREAT SERPL-MCNC: 1.69 MG/DL (ref 0.55–1.02)
DIFFERENTIAL METHOD BLD: ABNORMAL
EGFR (NO RACE VARIABLE) (RUSH/TITUS): 28 ML/MIN/1.73M2
EOSINOPHIL # BLD AUTO: 0.14 K/UL (ref 0–0.5)
EOSINOPHIL NFR BLD AUTO: 1.2 % (ref 1–4)
ERYTHROCYTE [DISTWIDTH] IN BLOOD BY AUTOMATED COUNT: 15 % (ref 11.5–14.5)
GLUCOSE SERPL-MCNC: 124 MG/DL (ref 75–121)
GLUCOSE SERPL-MCNC: 127 MG/DL (ref 70–105)
GLUCOSE SERPL-MCNC: 161 MG/DL (ref 70–105)
GLUCOSE SERPL-MCNC: 178 MG/DL (ref 70–105)
HCT VFR BLD AUTO: 22.9 % (ref 38–47)
HGB BLD-MCNC: 8.1 G/DL (ref 12–16)
IMM GRANULOCYTES # BLD AUTO: 0.15 K/UL (ref 0–0.04)
IMM GRANULOCYTES NFR BLD: 1.3 % (ref 0–0.4)
LIPASE SERPL-CCNC: 1264 U/L
LYMPHOCYTES # BLD AUTO: 4.2 K/UL (ref 1–4.8)
LYMPHOCYTES NFR BLD AUTO: 36.2 % (ref 27–41)
MCH RBC QN AUTO: 26.9 PG (ref 27–31)
MCHC RBC AUTO-ENTMCNC: 35.4 G/DL (ref 32–36)
MCV RBC AUTO: 76.1 FL (ref 80–96)
MONOCYTES # BLD AUTO: 0.82 K/UL (ref 0–0.8)
MONOCYTES NFR BLD AUTO: 7.1 % (ref 2–6)
MPC BLD CALC-MCNC: 10.4 FL (ref 9.4–12.4)
NEUTROPHILS # BLD AUTO: 6.24 K/UL (ref 1.8–7.7)
NEUTROPHILS NFR BLD AUTO: 53.9 % (ref 53–65)
NRBC # BLD AUTO: 0.04 X10E3/UL
NRBC, AUTO (.00): 0.3 %
NT-PROBNP SERPL-MCNC: 1495 PG/ML (ref 1–450)
PLATELET # BLD AUTO: 139 K/UL (ref 150–400)
POTASSIUM SERPL-SCNC: 4.1 MMOL/L (ref 3.5–5.1)
RBC # BLD AUTO: 3.01 M/UL (ref 4.2–5.4)
SODIUM SERPL-SCNC: 137 MMOL/L (ref 136–145)
TRIGL SERPL-MCNC: 106 MG/DL (ref 37–140)
TROPONIN I SERPL HS-MCNC: 150.9 NG/L
UA COMPLETE W REFLEX CULTURE PNL UR: ABNORMAL
WBC # BLD AUTO: 11.59 K/UL (ref 4.5–11)

## 2025-08-13 PROCEDURE — 63600175 PHARM REV CODE 636 W HCPCS: Performed by: STUDENT IN AN ORGANIZED HEALTH CARE EDUCATION/TRAINING PROGRAM

## 2025-08-13 PROCEDURE — 80048 BASIC METABOLIC PNL TOTAL CA: CPT | Performed by: STUDENT IN AN ORGANIZED HEALTH CARE EDUCATION/TRAINING PROGRAM

## 2025-08-13 PROCEDURE — 84478 ASSAY OF TRIGLYCERIDES: CPT | Performed by: STUDENT IN AN ORGANIZED HEALTH CARE EDUCATION/TRAINING PROGRAM

## 2025-08-13 PROCEDURE — 20000000 HC ICU ROOM

## 2025-08-13 PROCEDURE — 99900035 HC TECH TIME PER 15 MIN (STAT)

## 2025-08-13 PROCEDURE — 25000003 PHARM REV CODE 250: Performed by: STUDENT IN AN ORGANIZED HEALTH CARE EDUCATION/TRAINING PROGRAM

## 2025-08-13 PROCEDURE — 84484 ASSAY OF TROPONIN QUANT: CPT

## 2025-08-13 PROCEDURE — 36415 COLL VENOUS BLD VENIPUNCTURE: CPT | Performed by: STUDENT IN AN ORGANIZED HEALTH CARE EDUCATION/TRAINING PROGRAM

## 2025-08-13 PROCEDURE — 99291 CRITICAL CARE FIRST HOUR: CPT | Mod: ,,, | Performed by: STUDENT IN AN ORGANIZED HEALTH CARE EDUCATION/TRAINING PROGRAM

## 2025-08-13 PROCEDURE — 85025 COMPLETE CBC W/AUTO DIFF WBC: CPT | Performed by: STUDENT IN AN ORGANIZED HEALTH CARE EDUCATION/TRAINING PROGRAM

## 2025-08-13 PROCEDURE — 94761 N-INVAS EAR/PLS OXIMETRY MLT: CPT

## 2025-08-13 PROCEDURE — 83690 ASSAY OF LIPASE: CPT | Performed by: STUDENT IN AN ORGANIZED HEALTH CARE EDUCATION/TRAINING PROGRAM

## 2025-08-13 PROCEDURE — 82962 GLUCOSE BLOOD TEST: CPT

## 2025-08-13 PROCEDURE — 82150 ASSAY OF AMYLASE: CPT | Performed by: STUDENT IN AN ORGANIZED HEALTH CARE EDUCATION/TRAINING PROGRAM

## 2025-08-13 RX ORDER — SODIUM CHLORIDE, SODIUM LACTATE, POTASSIUM CHLORIDE, CALCIUM CHLORIDE 600; 310; 30; 20 MG/100ML; MG/100ML; MG/100ML; MG/100ML
INJECTION, SOLUTION INTRAVENOUS CONTINUOUS
Status: DISCONTINUED | OUTPATIENT
Start: 2025-08-13 | End: 2025-08-14

## 2025-08-13 RX ORDER — ELECTROLYTES/DEXTROSE
200 SOLUTION, ORAL ORAL
Status: DISCONTINUED | OUTPATIENT
Start: 2025-08-13 | End: 2025-08-13

## 2025-08-13 RX ORDER — NOREPINEPHRINE BITARTRATE/D5W 4MG/250ML
0-.2 PLASTIC BAG, INJECTION (ML) INTRAVENOUS CONTINUOUS
Status: DISCONTINUED | OUTPATIENT
Start: 2025-08-13 | End: 2025-08-14

## 2025-08-13 RX ORDER — ELECTROLYTES/DEXTROSE
400 SOLUTION, ORAL ORAL
Status: COMPLETED | OUTPATIENT
Start: 2025-08-13 | End: 2025-08-14

## 2025-08-13 RX ADMIN — NOREPINEPHRINE BITARTRATE 0.2 MCG/KG/MIN: 4 INJECTION, SOLUTION INTRAVENOUS at 12:08

## 2025-08-13 RX ADMIN — Medication 400 ML: at 05:08

## 2025-08-13 RX ADMIN — ENOXAPARIN SODIUM 30 MG: 30 INJECTION SUBCUTANEOUS at 05:08

## 2025-08-13 RX ADMIN — MUPIROCIN: 20 OINTMENT TOPICAL at 08:08

## 2025-08-13 RX ADMIN — Medication 200 ML: at 09:08

## 2025-08-13 RX ADMIN — PIPERACILLIN SODIUM AND TAZOBACTAM SODIUM 4.5 G: 4; .5 INJECTION, POWDER, FOR SOLUTION INTRAVENOUS at 04:08

## 2025-08-13 RX ADMIN — Medication 200 ML: at 12:08

## 2025-08-13 RX ADMIN — Medication 400 ML: at 10:08

## 2025-08-13 RX ADMIN — SODIUM CHLORIDE, POTASSIUM CHLORIDE, SODIUM LACTATE AND CALCIUM CHLORIDE: 600; 310; 30; 20 INJECTION, SOLUTION INTRAVENOUS at 09:08

## 2025-08-13 RX ADMIN — PIPERACILLIN SODIUM AND TAZOBACTAM SODIUM 4.5 G: 4; .5 INJECTION, POWDER, FOR SOLUTION INTRAVENOUS at 02:08

## 2025-08-14 LAB
ALBUMIN SERPL BCP-MCNC: 1.4 G/DL (ref 3.4–4.8)
ALP SERPL-CCNC: 80 U/L (ref 40–150)
ALT SERPL W P-5'-P-CCNC: 45 U/L
ANION GAP SERPL CALCULATED.3IONS-SCNC: 6 MMOL/L (ref 7–16)
AST SERPL W P-5'-P-CCNC: 38 U/L (ref 11–45)
BASOPHILS # BLD AUTO: 0.02 K/UL (ref 0–0.2)
BASOPHILS NFR BLD AUTO: 0.2 % (ref 0–1)
BILIRUB DIRECT SERPL-MCNC: 0.4 MG/DL
BILIRUB SERPL-MCNC: 0.5 MG/DL
BUN SERPL-MCNC: 58 MG/DL (ref 10–20)
BUN/CREAT SERPL: 38 (ref 6–20)
CALCIUM SERPL-MCNC: 7.8 MG/DL (ref 8.4–10.2)
CHLORIDE SERPL-SCNC: 117 MMOL/L (ref 98–111)
CO2 SERPL-SCNC: 18 MMOL/L (ref 23–31)
CREAT SERPL-MCNC: 1.54 MG/DL (ref 0.55–1.02)
DIFFERENTIAL METHOD BLD: ABNORMAL
EGFR (NO RACE VARIABLE) (RUSH/TITUS): 31 ML/MIN/1.73M2
EOSINOPHIL # BLD AUTO: 0.06 K/UL (ref 0–0.5)
EOSINOPHIL NFR BLD AUTO: 0.6 % (ref 1–4)
ERYTHROCYTE [DISTWIDTH] IN BLOOD BY AUTOMATED COUNT: 15 % (ref 11.5–14.5)
GLUCOSE SERPL-MCNC: 122 MG/DL (ref 70–105)
GLUCOSE SERPL-MCNC: 127 MG/DL (ref 75–121)
GLUCOSE SERPL-MCNC: 153 MG/DL (ref 70–105)
HCT VFR BLD AUTO: 21.2 % (ref 38–47)
HGB BLD-MCNC: 7.2 G/DL (ref 12–16)
IMM GRANULOCYTES # BLD AUTO: 0.07 K/UL (ref 0–0.04)
IMM GRANULOCYTES NFR BLD: 0.6 % (ref 0–0.4)
LYMPHOCYTES # BLD AUTO: 3.36 K/UL (ref 1–4.8)
LYMPHOCYTES NFR BLD AUTO: 30.9 % (ref 27–41)
MCH RBC QN AUTO: 27 PG (ref 27–31)
MCHC RBC AUTO-ENTMCNC: 34 G/DL (ref 32–36)
MCV RBC AUTO: 79.4 FL (ref 80–96)
MONOCYTES # BLD AUTO: 0.71 K/UL (ref 0–0.8)
MONOCYTES NFR BLD AUTO: 6.5 % (ref 2–6)
MPC BLD CALC-MCNC: 11.1 FL (ref 9.4–12.4)
NEUTROPHILS # BLD AUTO: 6.66 K/UL (ref 1.8–7.7)
NEUTROPHILS NFR BLD AUTO: 61.2 % (ref 53–65)
NRBC # BLD AUTO: 0.02 X10E3/UL
NRBC, AUTO (.00): 0.2 %
PHOSPHATE SERPL-MCNC: 1.6 MG/DL (ref 2.3–4.7)
PLATELET # BLD AUTO: 106 K/UL (ref 150–400)
POTASSIUM SERPL-SCNC: 4.4 MMOL/L (ref 3.5–5.1)
PROT SERPL-MCNC: 5.5 G/DL (ref 5.8–7.6)
RBC # BLD AUTO: 2.67 M/UL (ref 4.2–5.4)
SODIUM SERPL-SCNC: 137 MMOL/L (ref 136–145)
WBC # BLD AUTO: 10.88 K/UL (ref 4.5–11)

## 2025-08-14 PROCEDURE — 99291 CRITICAL CARE FIRST HOUR: CPT | Mod: ,,, | Performed by: STUDENT IN AN ORGANIZED HEALTH CARE EDUCATION/TRAINING PROGRAM

## 2025-08-14 PROCEDURE — 25000003 PHARM REV CODE 250

## 2025-08-14 PROCEDURE — 63600175 PHARM REV CODE 636 W HCPCS: Performed by: STUDENT IN AN ORGANIZED HEALTH CARE EDUCATION/TRAINING PROGRAM

## 2025-08-14 PROCEDURE — 85025 COMPLETE CBC W/AUTO DIFF WBC: CPT | Performed by: STUDENT IN AN ORGANIZED HEALTH CARE EDUCATION/TRAINING PROGRAM

## 2025-08-14 PROCEDURE — 11000001 HC ACUTE MED/SURG PRIVATE ROOM

## 2025-08-14 PROCEDURE — 25000003 PHARM REV CODE 250: Performed by: STUDENT IN AN ORGANIZED HEALTH CARE EDUCATION/TRAINING PROGRAM

## 2025-08-14 PROCEDURE — 36415 COLL VENOUS BLD VENIPUNCTURE: CPT | Performed by: STUDENT IN AN ORGANIZED HEALTH CARE EDUCATION/TRAINING PROGRAM

## 2025-08-14 PROCEDURE — 97162 PT EVAL MOD COMPLEX 30 MIN: CPT

## 2025-08-14 PROCEDURE — 97166 OT EVAL MOD COMPLEX 45 MIN: CPT

## 2025-08-14 PROCEDURE — 80076 HEPATIC FUNCTION PANEL: CPT | Performed by: STUDENT IN AN ORGANIZED HEALTH CARE EDUCATION/TRAINING PROGRAM

## 2025-08-14 PROCEDURE — 80048 BASIC METABOLIC PNL TOTAL CA: CPT | Performed by: STUDENT IN AN ORGANIZED HEALTH CARE EDUCATION/TRAINING PROGRAM

## 2025-08-14 PROCEDURE — 94761 N-INVAS EAR/PLS OXIMETRY MLT: CPT

## 2025-08-14 PROCEDURE — 84100 ASSAY OF PHOSPHORUS: CPT | Performed by: STUDENT IN AN ORGANIZED HEALTH CARE EDUCATION/TRAINING PROGRAM

## 2025-08-14 PROCEDURE — 82962 GLUCOSE BLOOD TEST: CPT

## 2025-08-14 RX ORDER — CEFTRIAXONE 2 G/1
2 INJECTION, POWDER, FOR SOLUTION INTRAMUSCULAR; INTRAVENOUS
Status: COMPLETED | OUTPATIENT
Start: 2025-08-14 | End: 2025-08-17

## 2025-08-14 RX ORDER — ELECTROLYTES/DEXTROSE
400 SOLUTION, ORAL ORAL
Status: ACTIVE | OUTPATIENT
Start: 2025-08-14 | End: 2025-08-15

## 2025-08-14 RX ADMIN — Medication 400 ML: at 04:08

## 2025-08-14 RX ADMIN — POTASSIUM PHOSPHATE, MONOBASIC POTASSIUM PHOSPHATE, DIBASIC 20 MMOL: 224; 236 INJECTION, SOLUTION, CONCENTRATE INTRAVENOUS at 04:08

## 2025-08-14 RX ADMIN — PIPERACILLIN SODIUM AND TAZOBACTAM SODIUM 4.5 G: 4; .5 INJECTION, POWDER, FOR SOLUTION INTRAVENOUS at 03:08

## 2025-08-14 RX ADMIN — Medication 400 ML: at 08:08

## 2025-08-14 RX ADMIN — Medication 400 ML: at 12:08

## 2025-08-14 RX ADMIN — MUPIROCIN: 20 OINTMENT TOPICAL at 08:08

## 2025-08-14 RX ADMIN — CEFTRIAXONE SODIUM 2 G: 2 INJECTION, POWDER, FOR SOLUTION INTRAMUSCULAR; INTRAVENOUS at 03:08

## 2025-08-14 RX ADMIN — Medication 400 ML: at 03:08

## 2025-08-14 RX ADMIN — ENOXAPARIN SODIUM 30 MG: 30 INJECTION SUBCUTANEOUS at 04:08

## 2025-08-14 RX ADMIN — Medication 400 ML: at 10:08

## 2025-08-14 RX ADMIN — SODIUM CHLORIDE, POTASSIUM CHLORIDE, SODIUM LACTATE AND CALCIUM CHLORIDE: 600; 310; 30; 20 INJECTION, SOLUTION INTRAVENOUS at 03:08

## 2025-08-15 PROBLEM — I21.A1 TYPE 2 MI (MYOCARDIAL INFARCTION): Status: ACTIVE | Noted: 2025-08-12

## 2025-08-15 LAB
ABORH RETYPE: NORMAL
ANION GAP SERPL CALCULATED.3IONS-SCNC: 11 MMOL/L (ref 7–16)
BASOPHILS # BLD AUTO: 0.03 K/UL (ref 0–0.2)
BASOPHILS NFR BLD AUTO: 0.2 % (ref 0–1)
BUN SERPL-MCNC: 43 MG/DL (ref 10–20)
BUN/CREAT SERPL: 36 (ref 6–20)
CALCIUM SERPL-MCNC: 8.2 MG/DL (ref 8.4–10.2)
CHLORIDE SERPL-SCNC: 116 MMOL/L (ref 98–111)
CO2 SERPL-SCNC: 16 MMOL/L (ref 23–31)
CREAT SERPL-MCNC: 1.18 MG/DL (ref 0.55–1.02)
DIFFERENTIAL METHOD BLD: ABNORMAL
EGFR (NO RACE VARIABLE) (RUSH/TITUS): 43 ML/MIN/1.73M2
EOSINOPHIL # BLD AUTO: 0.17 K/UL (ref 0–0.5)
EOSINOPHIL NFR BLD AUTO: 1.2 % (ref 1–4)
ERYTHROCYTE [DISTWIDTH] IN BLOOD BY AUTOMATED COUNT: 15.9 % (ref 11.5–14.5)
GLUCOSE SERPL-MCNC: 158 MG/DL (ref 75–121)
HCT VFR BLD AUTO: 22.9 % (ref 38–47)
HGB BLD-MCNC: 7.7 G/DL (ref 12–16)
IMM GRANULOCYTES # BLD AUTO: 0.09 K/UL (ref 0–0.04)
IMM GRANULOCYTES NFR BLD: 0.6 % (ref 0–0.4)
INDIRECT COOMBS: NORMAL
LYMPHOCYTES # BLD AUTO: 2.76 K/UL (ref 1–4.8)
LYMPHOCYTES NFR BLD AUTO: 19.1 % (ref 27–41)
MCH RBC QN AUTO: 26.8 PG (ref 27–31)
MCHC RBC AUTO-ENTMCNC: 33.6 G/DL (ref 32–36)
MCV RBC AUTO: 79.8 FL (ref 80–96)
MONOCYTES # BLD AUTO: 0.81 K/UL (ref 0–0.8)
MONOCYTES NFR BLD AUTO: 5.6 % (ref 2–6)
MPC BLD CALC-MCNC: 10.8 FL (ref 9.4–12.4)
NEUTROPHILS # BLD AUTO: 10.62 K/UL (ref 1.8–7.7)
NEUTROPHILS NFR BLD AUTO: 73.3 % (ref 53–65)
NRBC # BLD AUTO: 0 X10E3/UL
NRBC, AUTO (.00): 0 %
PLATELET # BLD AUTO: 146 K/UL (ref 150–400)
POTASSIUM SERPL-SCNC: 4.5 MMOL/L (ref 3.5–5.1)
RBC # BLD AUTO: 2.87 M/UL (ref 4.2–5.4)
RH BLD: NORMAL
SODIUM SERPL-SCNC: 138 MMOL/L (ref 136–145)
SPECIMEN OUTDATE: NORMAL
WBC # BLD AUTO: 14.48 K/UL (ref 4.5–11)

## 2025-08-15 PROCEDURE — 11000001 HC ACUTE MED/SURG PRIVATE ROOM

## 2025-08-15 PROCEDURE — 63600175 PHARM REV CODE 636 W HCPCS: Performed by: STUDENT IN AN ORGANIZED HEALTH CARE EDUCATION/TRAINING PROGRAM

## 2025-08-15 PROCEDURE — 25000003 PHARM REV CODE 250: Performed by: HOSPITALIST

## 2025-08-15 PROCEDURE — 85025 COMPLETE CBC W/AUTO DIFF WBC: CPT | Performed by: STUDENT IN AN ORGANIZED HEALTH CARE EDUCATION/TRAINING PROGRAM

## 2025-08-15 PROCEDURE — 86580 TB INTRADERMAL TEST: CPT | Performed by: HOSPITALIST

## 2025-08-15 PROCEDURE — 30200315 PPD INTRADERMAL TEST REV CODE 302: Performed by: HOSPITALIST

## 2025-08-15 PROCEDURE — 36415 COLL VENOUS BLD VENIPUNCTURE: CPT | Performed by: STUDENT IN AN ORGANIZED HEALTH CARE EDUCATION/TRAINING PROGRAM

## 2025-08-15 PROCEDURE — 25000003 PHARM REV CODE 250: Performed by: STUDENT IN AN ORGANIZED HEALTH CARE EDUCATION/TRAINING PROGRAM

## 2025-08-15 PROCEDURE — 97530 THERAPEUTIC ACTIVITIES: CPT | Mod: CQ

## 2025-08-15 PROCEDURE — 99233 SBSQ HOSP IP/OBS HIGH 50: CPT | Mod: ,,, | Performed by: HOSPITALIST

## 2025-08-15 PROCEDURE — 97535 SELF CARE MNGMENT TRAINING: CPT

## 2025-08-15 PROCEDURE — 80048 BASIC METABOLIC PNL TOTAL CA: CPT | Performed by: STUDENT IN AN ORGANIZED HEALTH CARE EDUCATION/TRAINING PROGRAM

## 2025-08-15 PROCEDURE — 86901 BLOOD TYPING SEROLOGIC RH(D): CPT | Performed by: STUDENT IN AN ORGANIZED HEALTH CARE EDUCATION/TRAINING PROGRAM

## 2025-08-15 RX ADMIN — CEFTRIAXONE SODIUM 2 G: 2 INJECTION, POWDER, FOR SOLUTION INTRAMUSCULAR; INTRAVENOUS at 05:08

## 2025-08-15 RX ADMIN — TUBERCULIN PURIFIED PROTEIN DERIVATIVE 5 UNITS: 5 INJECTION, SOLUTION INTRADERMAL at 03:08

## 2025-08-15 RX ADMIN — ENOXAPARIN SODIUM 30 MG: 30 INJECTION SUBCUTANEOUS at 05:08

## 2025-08-15 RX ADMIN — Medication 400 ML: at 10:08

## 2025-08-15 RX ADMIN — MUPIROCIN: 20 OINTMENT TOPICAL at 08:08

## 2025-08-15 RX ADMIN — ACETAMINOPHEN 650 MG: 325 TABLET ORAL at 10:08

## 2025-08-15 RX ADMIN — MUPIROCIN: 20 OINTMENT TOPICAL at 10:08

## 2025-08-15 RX ADMIN — Medication 400 ML: at 12:08

## 2025-08-16 LAB
ANION GAP SERPL CALCULATED.3IONS-SCNC: 4 MMOL/L (ref 7–16)
BASOPHILS # BLD AUTO: 0.03 K/UL (ref 0–0.2)
BASOPHILS NFR BLD AUTO: 0.2 % (ref 0–1)
BUN SERPL-MCNC: 43 MG/DL (ref 10–20)
BUN/CREAT SERPL: 55 (ref 6–20)
CALCIUM SERPL-MCNC: 8.1 MG/DL (ref 8.4–10.2)
CHLORIDE SERPL-SCNC: 118 MMOL/L (ref 98–111)
CO2 SERPL-SCNC: 22 MMOL/L (ref 23–31)
CREAT SERPL-MCNC: 0.78 MG/DL (ref 0.55–1.02)
DIFFERENTIAL METHOD BLD: ABNORMAL
EGFR (NO RACE VARIABLE) (RUSH/TITUS): 71 ML/MIN/1.73M2
EOSINOPHIL # BLD AUTO: 0.14 K/UL (ref 0–0.5)
EOSINOPHIL NFR BLD AUTO: 1.2 % (ref 1–4)
ERYTHROCYTE [DISTWIDTH] IN BLOOD BY AUTOMATED COUNT: 17 % (ref 11.5–14.5)
GLUCOSE SERPL-MCNC: 94 MG/DL (ref 75–121)
HCT VFR BLD AUTO: 22.8 % (ref 38–47)
HGB BLD-MCNC: 7.9 G/DL (ref 12–16)
IMM GRANULOCYTES # BLD AUTO: 0.12 K/UL (ref 0–0.04)
IMM GRANULOCYTES NFR BLD: 1 % (ref 0–0.4)
LYMPHOCYTES # BLD AUTO: 3.01 K/UL (ref 1–4.8)
LYMPHOCYTES NFR BLD AUTO: 24.9 % (ref 27–41)
MCH RBC QN AUTO: 27 PG (ref 27–31)
MCHC RBC AUTO-ENTMCNC: 34.6 G/DL (ref 32–36)
MCV RBC AUTO: 77.8 FL (ref 80–96)
MONOCYTES # BLD AUTO: 0.72 K/UL (ref 0–0.8)
MONOCYTES NFR BLD AUTO: 6 % (ref 2–6)
MPC BLD CALC-MCNC: 10.8 FL (ref 9.4–12.4)
NEUTROPHILS # BLD AUTO: 8.06 K/UL (ref 1.8–7.7)
NEUTROPHILS NFR BLD AUTO: 66.7 % (ref 53–65)
NRBC # BLD AUTO: 0 X10E3/UL
NRBC, AUTO (.00): 0 %
PLATELET # BLD AUTO: 210 K/UL (ref 150–400)
POTASSIUM SERPL-SCNC: 4.8 MMOL/L (ref 3.5–5.1)
RBC # BLD AUTO: 2.93 M/UL (ref 4.2–5.4)
SODIUM SERPL-SCNC: 139 MMOL/L (ref 136–145)
WBC # BLD AUTO: 12.08 K/UL (ref 4.5–11)

## 2025-08-16 PROCEDURE — 63600175 PHARM REV CODE 636 W HCPCS: Performed by: STUDENT IN AN ORGANIZED HEALTH CARE EDUCATION/TRAINING PROGRAM

## 2025-08-16 PROCEDURE — 63600175 PHARM REV CODE 636 W HCPCS: Performed by: INTERNAL MEDICINE

## 2025-08-16 PROCEDURE — 25000003 PHARM REV CODE 250: Performed by: HOSPITALIST

## 2025-08-16 PROCEDURE — 63600175 PHARM REV CODE 636 W HCPCS: Mod: JZ,TB | Performed by: HOSPITALIST

## 2025-08-16 PROCEDURE — 85025 COMPLETE CBC W/AUTO DIFF WBC: CPT | Performed by: STUDENT IN AN ORGANIZED HEALTH CARE EDUCATION/TRAINING PROGRAM

## 2025-08-16 PROCEDURE — 99233 SBSQ HOSP IP/OBS HIGH 50: CPT | Mod: ,,, | Performed by: HOSPITALIST

## 2025-08-16 PROCEDURE — 94761 N-INVAS EAR/PLS OXIMETRY MLT: CPT

## 2025-08-16 PROCEDURE — 25000003 PHARM REV CODE 250: Performed by: STUDENT IN AN ORGANIZED HEALTH CARE EDUCATION/TRAINING PROGRAM

## 2025-08-16 PROCEDURE — 80048 BASIC METABOLIC PNL TOTAL CA: CPT | Performed by: STUDENT IN AN ORGANIZED HEALTH CARE EDUCATION/TRAINING PROGRAM

## 2025-08-16 PROCEDURE — 11000001 HC ACUTE MED/SURG PRIVATE ROOM

## 2025-08-16 PROCEDURE — 36415 COLL VENOUS BLD VENIPUNCTURE: CPT | Performed by: STUDENT IN AN ORGANIZED HEALTH CARE EDUCATION/TRAINING PROGRAM

## 2025-08-16 RX ORDER — DOBUTAMINE HYDROCHLORIDE 400 MG/100ML
10 INJECTION INTRAVENOUS CONTINUOUS
Status: DISCONTINUED | OUTPATIENT
Start: 2025-08-16 | End: 2025-08-18

## 2025-08-16 RX ORDER — KETOROLAC TROMETHAMINE 15 MG/ML
15 INJECTION, SOLUTION INTRAMUSCULAR; INTRAVENOUS EVERY 6 HOURS PRN
Status: DISPENSED | OUTPATIENT
Start: 2025-08-16 | End: 2025-08-19

## 2025-08-16 RX ORDER — DOPAMINE HYDROCHLORIDE 160 MG/100ML
10 INJECTION, SOLUTION INTRAVENOUS CONTINUOUS
Status: DISCONTINUED | OUTPATIENT
Start: 2025-08-16 | End: 2025-08-16

## 2025-08-16 RX ADMIN — ACETAMINOPHEN 650 MG: 325 TABLET ORAL at 01:08

## 2025-08-16 RX ADMIN — CEFTRIAXONE SODIUM 2 G: 2 INJECTION, POWDER, FOR SOLUTION INTRAMUSCULAR; INTRAVENOUS at 04:08

## 2025-08-16 RX ADMIN — MUPIROCIN: 20 OINTMENT TOPICAL at 09:08

## 2025-08-16 RX ADMIN — DOBUTAMINE HYDROCHLORIDE 5 MCG/KG/MIN: 400 INJECTION INTRAVENOUS at 07:08

## 2025-08-16 RX ADMIN — ENOXAPARIN SODIUM 30 MG: 30 INJECTION SUBCUTANEOUS at 05:08

## 2025-08-16 RX ADMIN — KETOROLAC TROMETHAMINE 15 MG: 15 INJECTION, SOLUTION INTRAMUSCULAR; INTRAVENOUS at 05:08

## 2025-08-16 RX ADMIN — DOPAMINE HYDROCHLORIDE 10 MCG/KG/MIN: 160 INJECTION, SOLUTION INTRAVENOUS at 06:08

## 2025-08-16 RX ADMIN — SODIUM CHLORIDE 500 ML: 9 INJECTION, SOLUTION INTRAVENOUS at 05:08

## 2025-08-17 PROBLEM — D62 ACUTE BLOOD LOSS ANEMIA: Status: ACTIVE | Noted: 2025-08-17

## 2025-08-17 LAB
ABO + RH BLD: NORMAL
ANION GAP SERPL CALCULATED.3IONS-SCNC: 6 MMOL/L (ref 7–16)
BACTERIA #/AREA URNS HPF: ABNORMAL /HPF
BACTERIA BLD CULT: NORMAL
BACTERIA BLD CULT: NORMAL
BASOPHILS # BLD AUTO: 0.03 K/UL (ref 0–0.2)
BASOPHILS NFR BLD AUTO: 0.3 % (ref 0–1)
BILIRUB UR QL STRIP: NEGATIVE
BLD PROD TYP BPU: NORMAL
BLOOD UNIT EXPIRATION DATE: NORMAL
BLOOD UNIT TYPE CODE: 7300
BUN SERPL-MCNC: 41 MG/DL (ref 10–20)
BUN/CREAT SERPL: 49 (ref 6–20)
CALCIUM SERPL-MCNC: 7.9 MG/DL (ref 8.4–10.2)
CHLORIDE SERPL-SCNC: 115 MMOL/L (ref 98–111)
CLARITY UR: ABNORMAL
CO2 SERPL-SCNC: 21 MMOL/L (ref 23–31)
COLOR UR: YELLOW
CREAT SERPL-MCNC: 0.84 MG/DL (ref 0.55–1.02)
CROSSMATCH INTERPRETATION: NORMAL
DIFFERENTIAL METHOD BLD: ABNORMAL
DISPENSE STATUS: NORMAL
EGFR (NO RACE VARIABLE) (RUSH/TITUS): 65 ML/MIN/1.73M2
EOSINOPHIL # BLD AUTO: 0.08 K/UL (ref 0–0.5)
EOSINOPHIL NFR BLD AUTO: 0.7 % (ref 1–4)
ERYTHROCYTE [DISTWIDTH] IN BLOOD BY AUTOMATED COUNT: 17.4 % (ref 11.5–14.5)
GLUCOSE SERPL-MCNC: 97 MG/DL (ref 75–121)
GLUCOSE UR STRIP-MCNC: NORMAL MG/DL
HCT VFR BLD AUTO: 20.8 % (ref 38–47)
HCT VFR BLD AUTO: 29.5 % (ref 38–47)
HGB BLD-MCNC: 10.1 G/DL (ref 12–16)
HGB BLD-MCNC: 6.9 G/DL (ref 12–16)
IMM GRANULOCYTES # BLD AUTO: 0.09 K/UL (ref 0–0.04)
IMM GRANULOCYTES NFR BLD: 0.8 % (ref 0–0.4)
KETONES UR STRIP-SCNC: NEGATIVE MG/DL
LEUKOCYTE ESTERASE UR QL STRIP: ABNORMAL
LIPASE SERPL-CCNC: 189 U/L
LYMPHOCYTES # BLD AUTO: 3.33 K/UL (ref 1–4.8)
LYMPHOCYTES NFR BLD AUTO: 28.4 % (ref 27–41)
MAGNESIUM SERPL-MCNC: 2 MG/DL (ref 1.6–2.6)
MCH RBC QN AUTO: 26.7 PG (ref 27–31)
MCHC RBC AUTO-ENTMCNC: 33.2 G/DL (ref 32–36)
MCV RBC AUTO: 80.6 FL (ref 80–96)
MONOCYTES # BLD AUTO: 1.08 K/UL (ref 0–0.8)
MONOCYTES NFR BLD AUTO: 9.2 % (ref 2–6)
MPC BLD CALC-MCNC: 10.4 FL (ref 9.4–12.4)
NEUTROPHILS # BLD AUTO: 7.11 K/UL (ref 1.8–7.7)
NEUTROPHILS NFR BLD AUTO: 60.6 % (ref 53–65)
NITRITE UR QL STRIP: NEGATIVE
NRBC # BLD AUTO: 0.02 X10E3/UL
NRBC, AUTO (.00): 0.2 %
OCCULT BLOOD: NEGATIVE
PH UR STRIP: 6 PH UNITS
PHOSPHATE SERPL-MCNC: 2.1 MG/DL (ref 2.3–4.7)
PLATELET # BLD AUTO: 239 K/UL (ref 150–400)
POTASSIUM SERPL-SCNC: 4.7 MMOL/L (ref 3.5–5.1)
PROT UR QL STRIP: 20
RBC # BLD AUTO: 2.58 M/UL (ref 4.2–5.4)
RBC # UR STRIP: ABNORMAL /UL
RBC #/AREA URNS HPF: 66 /HPF
SODIUM SERPL-SCNC: 137 MMOL/L (ref 136–145)
SP GR UR STRIP: 1.01
SQUAMOUS #/AREA URNS LPF: ABNORMAL /HPF
TB INDURATION 48 - 72 HR READ: 0 0MM
TB SKIN TEST 48 - 72 HR READ: NORMAL
UNIT NUMBER: NORMAL
UROBILINOGEN UR STRIP-ACNC: NORMAL MG/DL
WBC # BLD AUTO: 11.72 K/UL (ref 4.5–11)
WBC #/AREA URNS HPF: >182 /HPF
WBC CLUMPS, UA: ABNORMAL /HPF

## 2025-08-17 PROCEDURE — 85025 COMPLETE CBC W/AUTO DIFF WBC: CPT | Performed by: STUDENT IN AN ORGANIZED HEALTH CARE EDUCATION/TRAINING PROGRAM

## 2025-08-17 PROCEDURE — 63600175 PHARM REV CODE 636 W HCPCS: Performed by: STUDENT IN AN ORGANIZED HEALTH CARE EDUCATION/TRAINING PROGRAM

## 2025-08-17 PROCEDURE — 84100 ASSAY OF PHOSPHORUS: CPT | Performed by: HOSPITALIST

## 2025-08-17 PROCEDURE — 80048 BASIC METABOLIC PNL TOTAL CA: CPT | Performed by: STUDENT IN AN ORGANIZED HEALTH CARE EDUCATION/TRAINING PROGRAM

## 2025-08-17 PROCEDURE — P9016 RBC LEUKOCYTES REDUCED: HCPCS | Performed by: HOSPITALIST

## 2025-08-17 PROCEDURE — 36430 TRANSFUSION BLD/BLD COMPNT: CPT | Performed by: HOSPITALIST

## 2025-08-17 PROCEDURE — 85018 HEMOGLOBIN: CPT | Performed by: HOSPITALIST

## 2025-08-17 PROCEDURE — 81003 URINALYSIS AUTO W/O SCOPE: CPT | Performed by: HOSPITALIST

## 2025-08-17 PROCEDURE — 86923 COMPATIBILITY TEST ELECTRIC: CPT | Performed by: HOSPITALIST

## 2025-08-17 PROCEDURE — 36415 COLL VENOUS BLD VENIPUNCTURE: CPT | Performed by: HOSPITALIST

## 2025-08-17 PROCEDURE — 87086 URINE CULTURE/COLONY COUNT: CPT | Performed by: HOSPITALIST

## 2025-08-17 PROCEDURE — 99233 SBSQ HOSP IP/OBS HIGH 50: CPT | Mod: ,,, | Performed by: HOSPITALIST

## 2025-08-17 PROCEDURE — 83735 ASSAY OF MAGNESIUM: CPT | Performed by: HOSPITALIST

## 2025-08-17 PROCEDURE — 30233N1 TRANSFUSION OF NONAUTOLOGOUS RED BLOOD CELLS INTO PERIPHERAL VEIN, PERCUTANEOUS APPROACH: ICD-10-PCS | Performed by: HOSPITALIST

## 2025-08-17 PROCEDURE — 11000001 HC ACUTE MED/SURG PRIVATE ROOM

## 2025-08-17 PROCEDURE — 83690 ASSAY OF LIPASE: CPT | Performed by: HOSPITALIST

## 2025-08-17 PROCEDURE — 94761 N-INVAS EAR/PLS OXIMETRY MLT: CPT

## 2025-08-17 PROCEDURE — 82272 OCCULT BLD FECES 1-3 TESTS: CPT | Performed by: HOSPITALIST

## 2025-08-17 PROCEDURE — 36415 COLL VENOUS BLD VENIPUNCTURE: CPT | Performed by: STUDENT IN AN ORGANIZED HEALTH CARE EDUCATION/TRAINING PROGRAM

## 2025-08-17 RX ORDER — HYDROCODONE BITARTRATE AND ACETAMINOPHEN 500; 5 MG/1; MG/1
TABLET ORAL
Status: DISCONTINUED | OUTPATIENT
Start: 2025-08-17 | End: 2025-08-20 | Stop reason: HOSPADM

## 2025-08-17 RX ADMIN — MUPIROCIN: 20 OINTMENT TOPICAL at 09:08

## 2025-08-17 RX ADMIN — CEFTRIAXONE SODIUM 2 G: 2 INJECTION, POWDER, FOR SOLUTION INTRAMUSCULAR; INTRAVENOUS at 03:08

## 2025-08-18 LAB
ANION GAP SERPL CALCULATED.3IONS-SCNC: 7 MMOL/L (ref 7–16)
BASOPHILS # BLD AUTO: 0.04 K/UL (ref 0–0.2)
BASOPHILS NFR BLD AUTO: 0.3 % (ref 0–1)
BUN SERPL-MCNC: 23 MG/DL (ref 10–20)
BUN/CREAT SERPL: 28 (ref 6–20)
CALCIUM SERPL-MCNC: 8.1 MG/DL (ref 8.4–10.2)
CHLORIDE SERPL-SCNC: 113 MMOL/L (ref 98–111)
CO2 SERPL-SCNC: 21 MMOL/L (ref 23–31)
CREAT SERPL-MCNC: 0.82 MG/DL (ref 0.55–1.02)
DIFFERENTIAL METHOD BLD: ABNORMAL
EGFR (NO RACE VARIABLE) (RUSH/TITUS): 67 ML/MIN/1.73M2
EOSINOPHIL # BLD AUTO: 0.09 K/UL (ref 0–0.5)
EOSINOPHIL NFR BLD AUTO: 0.7 % (ref 1–4)
ERYTHROCYTE [DISTWIDTH] IN BLOOD BY AUTOMATED COUNT: 17.2 % (ref 11.5–14.5)
GLUCOSE SERPL-MCNC: 96 MG/DL (ref 75–121)
HCT VFR BLD AUTO: 25.4 % (ref 38–47)
HGB BLD-MCNC: 8.6 G/DL (ref 12–16)
IMM GRANULOCYTES # BLD AUTO: 0.1 K/UL (ref 0–0.04)
IMM GRANULOCYTES NFR BLD: 0.8 % (ref 0–0.4)
LYMPHOCYTES # BLD AUTO: 3.57 K/UL (ref 1–4.8)
LYMPHOCYTES NFR BLD AUTO: 29 % (ref 27–41)
MCH RBC QN AUTO: 27.4 PG (ref 27–31)
MCHC RBC AUTO-ENTMCNC: 33.9 G/DL (ref 32–36)
MCV RBC AUTO: 80.9 FL (ref 80–96)
MONOCYTES # BLD AUTO: 1.42 K/UL (ref 0–0.8)
MONOCYTES NFR BLD AUTO: 11.5 % (ref 2–6)
MPC BLD CALC-MCNC: 10.4 FL (ref 9.4–12.4)
NEUTROPHILS # BLD AUTO: 7.09 K/UL (ref 1.8–7.7)
NEUTROPHILS NFR BLD AUTO: 57.7 % (ref 53–65)
NRBC # BLD AUTO: 0 X10E3/UL
NRBC, AUTO (.00): 0 %
PLATELET # BLD AUTO: 287 K/UL (ref 150–400)
POTASSIUM SERPL-SCNC: 4.6 MMOL/L (ref 3.5–5.1)
RBC # BLD AUTO: 3.14 M/UL (ref 4.2–5.4)
SODIUM SERPL-SCNC: 136 MMOL/L (ref 136–145)
WBC # BLD AUTO: 12.31 K/UL (ref 4.5–11)

## 2025-08-18 PROCEDURE — 97110 THERAPEUTIC EXERCISES: CPT

## 2025-08-18 PROCEDURE — 36415 COLL VENOUS BLD VENIPUNCTURE: CPT | Performed by: STUDENT IN AN ORGANIZED HEALTH CARE EDUCATION/TRAINING PROGRAM

## 2025-08-18 PROCEDURE — 97140 MANUAL THERAPY 1/> REGIONS: CPT

## 2025-08-18 PROCEDURE — 94761 N-INVAS EAR/PLS OXIMETRY MLT: CPT

## 2025-08-18 PROCEDURE — 25000003 PHARM REV CODE 250: Performed by: STUDENT IN AN ORGANIZED HEALTH CARE EDUCATION/TRAINING PROGRAM

## 2025-08-18 PROCEDURE — 63600175 PHARM REV CODE 636 W HCPCS: Mod: JZ,TB | Performed by: HOSPITALIST

## 2025-08-18 PROCEDURE — 63600175 PHARM REV CODE 636 W HCPCS: Performed by: INTERNAL MEDICINE

## 2025-08-18 PROCEDURE — 25000003 PHARM REV CODE 250

## 2025-08-18 PROCEDURE — 97535 SELF CARE MNGMENT TRAINING: CPT

## 2025-08-18 PROCEDURE — 85025 COMPLETE CBC W/AUTO DIFF WBC: CPT | Performed by: STUDENT IN AN ORGANIZED HEALTH CARE EDUCATION/TRAINING PROGRAM

## 2025-08-18 PROCEDURE — 80048 BASIC METABOLIC PNL TOTAL CA: CPT | Performed by: STUDENT IN AN ORGANIZED HEALTH CARE EDUCATION/TRAINING PROGRAM

## 2025-08-18 PROCEDURE — 11000001 HC ACUTE MED/SURG PRIVATE ROOM

## 2025-08-18 RX ORDER — MIDODRINE HYDROCHLORIDE 5 MG/1
5 TABLET ORAL
Status: DISCONTINUED | OUTPATIENT
Start: 2025-08-18 | End: 2025-08-20 | Stop reason: HOSPADM

## 2025-08-18 RX ADMIN — MIDODRINE HYDROCHLORIDE 5 MG: 5 TABLET ORAL at 05:08

## 2025-08-18 RX ADMIN — KETOROLAC TROMETHAMINE 15 MG: 15 INJECTION, SOLUTION INTRAMUSCULAR; INTRAVENOUS at 09:08

## 2025-08-18 RX ADMIN — DOBUTAMINE HYDROCHLORIDE 10 MCG/KG/MIN: 400 INJECTION INTRAVENOUS at 04:08

## 2025-08-18 RX ADMIN — ACETAMINOPHEN 650 MG: 325 TABLET ORAL at 08:08

## 2025-08-18 RX ADMIN — MIDODRINE HYDROCHLORIDE 5 MG: 5 TABLET ORAL at 12:08

## 2025-08-19 LAB
ANION GAP SERPL CALCULATED.3IONS-SCNC: 6 MMOL/L (ref 7–16)
BASOPHILS # BLD AUTO: 0.06 K/UL (ref 0–0.2)
BASOPHILS NFR BLD AUTO: 0.6 % (ref 0–1)
BUN SERPL-MCNC: 30 MG/DL (ref 10–20)
BUN/CREAT SERPL: 36 (ref 6–20)
CALCIUM SERPL-MCNC: 8 MG/DL (ref 8.4–10.2)
CHLORIDE SERPL-SCNC: 112 MMOL/L (ref 98–111)
CO2 SERPL-SCNC: 22 MMOL/L (ref 23–31)
CREAT SERPL-MCNC: 0.84 MG/DL (ref 0.55–1.02)
DIFFERENTIAL METHOD BLD: ABNORMAL
EGFR (NO RACE VARIABLE) (RUSH/TITUS): 65 ML/MIN/1.73M2
EOSINOPHIL # BLD AUTO: 0.19 K/UL (ref 0–0.5)
EOSINOPHIL NFR BLD AUTO: 1.8 % (ref 1–4)
ERYTHROCYTE [DISTWIDTH] IN BLOOD BY AUTOMATED COUNT: 17.4 % (ref 11.5–14.5)
GLUCOSE SERPL-MCNC: 92 MG/DL (ref 75–121)
HCT VFR BLD AUTO: 25.4 % (ref 38–47)
HGB BLD-MCNC: 8.6 G/DL (ref 12–16)
IMM GRANULOCYTES # BLD AUTO: 0.11 K/UL (ref 0–0.04)
IMM GRANULOCYTES NFR BLD: 1 % (ref 0–0.4)
LYMPHOCYTES # BLD AUTO: 3.32 K/UL (ref 1–4.8)
LYMPHOCYTES NFR BLD AUTO: 31.1 % (ref 27–41)
MCH RBC QN AUTO: 27 PG (ref 27–31)
MCHC RBC AUTO-ENTMCNC: 33.9 G/DL (ref 32–36)
MCV RBC AUTO: 79.6 FL (ref 80–96)
MONOCYTES # BLD AUTO: 1.43 K/UL (ref 0–0.8)
MONOCYTES NFR BLD AUTO: 13.4 % (ref 2–6)
MPC BLD CALC-MCNC: 10 FL (ref 9.4–12.4)
NEUTROPHILS # BLD AUTO: 5.57 K/UL (ref 1.8–7.7)
NEUTROPHILS NFR BLD AUTO: 52.1 % (ref 53–65)
NRBC # BLD AUTO: 0 X10E3/UL
NRBC, AUTO (.00): 0 %
PLATELET # BLD AUTO: 318 K/UL (ref 150–400)
POTASSIUM SERPL-SCNC: 4.7 MMOL/L (ref 3.5–5.1)
RBC # BLD AUTO: 3.19 M/UL (ref 4.2–5.4)
SODIUM SERPL-SCNC: 135 MMOL/L (ref 136–145)
UA COMPLETE W REFLEX CULTURE PNL UR: NO GROWTH
WBC # BLD AUTO: 10.68 K/UL (ref 4.5–11)

## 2025-08-19 PROCEDURE — 25000003 PHARM REV CODE 250

## 2025-08-19 PROCEDURE — 11000001 HC ACUTE MED/SURG PRIVATE ROOM

## 2025-08-19 PROCEDURE — 97530 THERAPEUTIC ACTIVITIES: CPT

## 2025-08-19 PROCEDURE — 97110 THERAPEUTIC EXERCISES: CPT

## 2025-08-19 PROCEDURE — 80048 BASIC METABOLIC PNL TOTAL CA: CPT | Performed by: STUDENT IN AN ORGANIZED HEALTH CARE EDUCATION/TRAINING PROGRAM

## 2025-08-19 PROCEDURE — 36415 COLL VENOUS BLD VENIPUNCTURE: CPT | Performed by: STUDENT IN AN ORGANIZED HEALTH CARE EDUCATION/TRAINING PROGRAM

## 2025-08-19 PROCEDURE — 85025 COMPLETE CBC W/AUTO DIFF WBC: CPT | Performed by: STUDENT IN AN ORGANIZED HEALTH CARE EDUCATION/TRAINING PROGRAM

## 2025-08-19 RX ORDER — MIDODRINE HYDROCHLORIDE 5 MG/1
5 TABLET ORAL
Qty: 90 TABLET | Refills: 11 | Status: SHIPPED | OUTPATIENT
Start: 2025-08-19 | End: 2026-08-19

## 2025-08-19 RX ADMIN — MIDODRINE HYDROCHLORIDE 5 MG: 5 TABLET ORAL at 05:08

## 2025-08-19 RX ADMIN — MIDODRINE HYDROCHLORIDE 5 MG: 5 TABLET ORAL at 12:08

## 2025-08-19 RX ADMIN — MIDODRINE HYDROCHLORIDE 5 MG: 5 TABLET ORAL at 08:08

## 2025-08-20 VITALS
DIASTOLIC BLOOD PRESSURE: 71 MMHG | RESPIRATION RATE: 17 BRPM | SYSTOLIC BLOOD PRESSURE: 115 MMHG | HEART RATE: 86 BPM | BODY MASS INDEX: 19.94 KG/M2 | WEIGHT: 119.69 LBS | HEIGHT: 65 IN | TEMPERATURE: 98 F | OXYGEN SATURATION: 99 %

## 2025-08-20 LAB
ANION GAP SERPL CALCULATED.3IONS-SCNC: 7 MMOL/L (ref 7–16)
BASOPHILS # BLD AUTO: 0.05 K/UL (ref 0–0.2)
BASOPHILS NFR BLD AUTO: 0.4 % (ref 0–1)
BUN SERPL-MCNC: 25 MG/DL (ref 10–20)
BUN/CREAT SERPL: 35 (ref 6–20)
CALCIUM SERPL-MCNC: 8 MG/DL (ref 8.4–10.2)
CHLORIDE SERPL-SCNC: 108 MMOL/L (ref 98–111)
CO2 SERPL-SCNC: 22 MMOL/L (ref 23–31)
CREAT SERPL-MCNC: 0.72 MG/DL (ref 0.55–1.02)
DIFFERENTIAL METHOD BLD: ABNORMAL
EGFR (NO RACE VARIABLE) (RUSH/TITUS): 78 ML/MIN/1.73M2
EOSINOPHIL # BLD AUTO: 0.13 K/UL (ref 0–0.5)
EOSINOPHIL NFR BLD AUTO: 1.1 % (ref 1–4)
ERYTHROCYTE [DISTWIDTH] IN BLOOD BY AUTOMATED COUNT: 18 % (ref 11.5–14.5)
GLUCOSE SERPL-MCNC: 95 MG/DL (ref 75–121)
HCT VFR BLD AUTO: 28.2 % (ref 38–47)
HGB BLD-MCNC: 9.4 G/DL (ref 12–16)
IMM GRANULOCYTES # BLD AUTO: 0.11 K/UL (ref 0–0.04)
IMM GRANULOCYTES NFR BLD: 0.9 % (ref 0–0.4)
LYMPHOCYTES # BLD AUTO: 3.57 K/UL (ref 1–4.8)
LYMPHOCYTES NFR BLD AUTO: 30 % (ref 27–41)
MCH RBC QN AUTO: 26.9 PG (ref 27–31)
MCHC RBC AUTO-ENTMCNC: 33.3 G/DL (ref 32–36)
MCV RBC AUTO: 80.6 FL (ref 80–96)
MONOCYTES # BLD AUTO: 1.26 K/UL (ref 0–0.8)
MONOCYTES NFR BLD AUTO: 10.6 % (ref 2–6)
MPC BLD CALC-MCNC: 9.5 FL (ref 9.4–12.4)
NEUTROPHILS # BLD AUTO: 6.77 K/UL (ref 1.8–7.7)
NEUTROPHILS NFR BLD AUTO: 57 % (ref 53–65)
NRBC # BLD AUTO: 0 X10E3/UL
NRBC, AUTO (.00): 0 %
PLATELET # BLD AUTO: 333 K/UL (ref 150–400)
POTASSIUM SERPL-SCNC: 4.5 MMOL/L (ref 3.5–5.1)
RBC # BLD AUTO: 3.5 M/UL (ref 4.2–5.4)
SODIUM SERPL-SCNC: 132 MMOL/L (ref 136–145)
WBC # BLD AUTO: 11.89 K/UL (ref 4.5–11)

## 2025-08-20 PROCEDURE — 94761 N-INVAS EAR/PLS OXIMETRY MLT: CPT

## 2025-08-20 PROCEDURE — 25000003 PHARM REV CODE 250

## 2025-08-20 PROCEDURE — 36415 COLL VENOUS BLD VENIPUNCTURE: CPT | Performed by: STUDENT IN AN ORGANIZED HEALTH CARE EDUCATION/TRAINING PROGRAM

## 2025-08-20 PROCEDURE — 85025 COMPLETE CBC W/AUTO DIFF WBC: CPT | Performed by: STUDENT IN AN ORGANIZED HEALTH CARE EDUCATION/TRAINING PROGRAM

## 2025-08-20 PROCEDURE — 80048 BASIC METABOLIC PNL TOTAL CA: CPT | Performed by: STUDENT IN AN ORGANIZED HEALTH CARE EDUCATION/TRAINING PROGRAM

## 2025-08-20 RX ADMIN — MIDODRINE HYDROCHLORIDE 5 MG: 5 TABLET ORAL at 08:08
